# Patient Record
Sex: MALE | Race: WHITE | Employment: FULL TIME | URBAN - METROPOLITAN AREA
[De-identification: names, ages, dates, MRNs, and addresses within clinical notes are randomized per-mention and may not be internally consistent; named-entity substitution may affect disease eponyms.]

---

## 2022-10-19 ENCOUNTER — ANESTHESIA (OUTPATIENT)
Dept: SURGERY | Age: 62
End: 2022-10-19
Payer: MEDICAID

## 2022-10-19 ENCOUNTER — ANESTHESIA EVENT (OUTPATIENT)
Dept: SURGERY | Age: 62
End: 2022-10-19
Payer: MEDICAID

## 2022-10-19 ENCOUNTER — APPOINTMENT (OUTPATIENT)
Dept: CT IMAGING | Age: 62
DRG: 328 | End: 2022-10-19
Attending: EMERGENCY MEDICINE
Payer: COMMERCIAL

## 2022-10-19 ENCOUNTER — HOSPITAL ENCOUNTER (INPATIENT)
Age: 62
LOS: 7 days | Discharge: HOME HEALTH | DRG: 328 | End: 2022-10-26
Attending: EMERGENCY MEDICINE | Admitting: SURGERY
Payer: COMMERCIAL

## 2022-10-19 ENCOUNTER — APPOINTMENT (OUTPATIENT)
Dept: CT IMAGING | Age: 62
DRG: 328 | End: 2022-10-19
Attending: STUDENT IN AN ORGANIZED HEALTH CARE EDUCATION/TRAINING PROGRAM
Payer: COMMERCIAL

## 2022-10-19 DIAGNOSIS — R10.84 ABDOMINAL PAIN, GENERALIZED: ICD-10-CM

## 2022-10-19 DIAGNOSIS — K27.5 PERFORATED ULCER (HCC): ICD-10-CM

## 2022-10-19 DIAGNOSIS — K63.1 SMALL BOWEL PERFORATION (HCC): Primary | ICD-10-CM

## 2022-10-19 LAB
ALBUMIN SERPL-MCNC: 3.4 G/DL (ref 3.5–5)
ALBUMIN/GLOB SERPL: 0.9 {RATIO} (ref 1.1–2.2)
ALP SERPL-CCNC: 59 U/L (ref 45–117)
ALT SERPL-CCNC: 19 U/L (ref 12–78)
ANION GAP SERPL CALC-SCNC: 7 MMOL/L (ref 5–15)
AST SERPL-CCNC: 16 U/L (ref 15–37)
BASOPHILS # BLD: 0 K/UL (ref 0–0.1)
BASOPHILS NFR BLD: 0 % (ref 0–1)
BILIRUB SERPL-MCNC: 0.3 MG/DL (ref 0.2–1)
BUN SERPL-MCNC: 20 MG/DL (ref 6–20)
BUN/CREAT SERPL: 14 (ref 12–20)
CALCIUM SERPL-MCNC: 9.2 MG/DL (ref 8.5–10.1)
CHLORIDE SERPL-SCNC: 109 MMOL/L (ref 97–108)
CO2 SERPL-SCNC: 24 MMOL/L (ref 21–32)
COMMENT, HOLDF: NORMAL
CREAT SERPL-MCNC: 1.38 MG/DL (ref 0.7–1.3)
DIFFERENTIAL METHOD BLD: ABNORMAL
EOSINOPHIL # BLD: 0 K/UL (ref 0–0.4)
EOSINOPHIL NFR BLD: 0 % (ref 0–7)
ERYTHROCYTE [DISTWIDTH] IN BLOOD BY AUTOMATED COUNT: 13.4 % (ref 11.5–14.5)
GLOBULIN SER CALC-MCNC: 3.9 G/DL (ref 2–4)
GLUCOSE SERPL-MCNC: 178 MG/DL (ref 65–100)
HCT VFR BLD AUTO: 38.3 % (ref 36.6–50.3)
HGB BLD-MCNC: 12.4 G/DL (ref 12.1–17)
IMM GRANULOCYTES # BLD AUTO: 0.2 K/UL (ref 0–0.04)
IMM GRANULOCYTES NFR BLD AUTO: 1 % (ref 0–0.5)
LIPASE SERPL-CCNC: 623 U/L (ref 73–393)
LYMPHOCYTES # BLD: 0.5 K/UL (ref 0.8–3.5)
LYMPHOCYTES NFR BLD: 3 % (ref 12–49)
MCH RBC QN AUTO: 28 PG (ref 26–34)
MCHC RBC AUTO-ENTMCNC: 32.4 G/DL (ref 30–36.5)
MCV RBC AUTO: 86.5 FL (ref 80–99)
MONOCYTES # BLD: 0.7 K/UL (ref 0–1)
MONOCYTES NFR BLD: 4 % (ref 5–13)
NEUTS SEG # BLD: 16.8 K/UL (ref 1.8–8)
NEUTS SEG NFR BLD: 92 % (ref 32–75)
NRBC # BLD: 0 K/UL (ref 0–0.01)
NRBC BLD-RTO: 0 PER 100 WBC
PLATELET # BLD AUTO: 394 K/UL (ref 150–400)
PMV BLD AUTO: 11.4 FL (ref 8.9–12.9)
POTASSIUM SERPL-SCNC: 3.2 MMOL/L (ref 3.5–5.1)
PROT SERPL-MCNC: 7.3 G/DL (ref 6.4–8.2)
RBC # BLD AUTO: 4.43 M/UL (ref 4.1–5.7)
RBC MORPH BLD: ABNORMAL
SAMPLES BEING HELD,HOLD: NORMAL
SODIUM SERPL-SCNC: 140 MMOL/L (ref 136–145)
WBC # BLD AUTO: 18.2 K/UL (ref 4.1–11.1)

## 2022-10-19 PROCEDURE — 74011000250 HC RX REV CODE- 250: Performed by: SURGERY

## 2022-10-19 PROCEDURE — 74177 CT ABD & PELVIS W/CONTRAST: CPT

## 2022-10-19 PROCEDURE — 77030002996 HC SUT SLK J&J -A: Performed by: SURGERY

## 2022-10-19 PROCEDURE — 74011000636 HC RX REV CODE- 636: Performed by: EMERGENCY MEDICINE

## 2022-10-19 PROCEDURE — 99285 EMERGENCY DEPT VISIT HI MDM: CPT

## 2022-10-19 PROCEDURE — 77030008684 HC TU ET CUF COVD -B: Performed by: ANESTHESIOLOGY

## 2022-10-19 PROCEDURE — 74011250636 HC RX REV CODE- 250/636: Performed by: EMERGENCY MEDICINE

## 2022-10-19 PROCEDURE — 76010000149 HC OR TIME 1 TO 1.5 HR: Performed by: SURGERY

## 2022-10-19 PROCEDURE — 85025 COMPLETE CBC W/AUTO DIFF WBC: CPT

## 2022-10-19 PROCEDURE — 74011250636 HC RX REV CODE- 250/636: Performed by: ANESTHESIOLOGY

## 2022-10-19 PROCEDURE — 77030031139 HC SUT VCRL2 J&J -A: Performed by: SURGERY

## 2022-10-19 PROCEDURE — 71275 CT ANGIOGRAPHY CHEST: CPT

## 2022-10-19 PROCEDURE — 83690 ASSAY OF LIPASE: CPT

## 2022-10-19 PROCEDURE — 74011000250 HC RX REV CODE- 250: Performed by: NURSE ANESTHETIST, CERTIFIED REGISTERED

## 2022-10-19 PROCEDURE — 77030020061 HC IV BLD WRMR ADMIN SET 3M -B: Performed by: ANESTHESIOLOGY

## 2022-10-19 PROCEDURE — 76060000033 HC ANESTHESIA 1 TO 1.5 HR: Performed by: SURGERY

## 2022-10-19 PROCEDURE — 74011250636 HC RX REV CODE- 250/636: Performed by: STUDENT IN AN ORGANIZED HEALTH CARE EDUCATION/TRAINING PROGRAM

## 2022-10-19 PROCEDURE — 77030019908 HC STETH ESOPH SIMS -A: Performed by: ANESTHESIOLOGY

## 2022-10-19 PROCEDURE — 0DU707Z SUPPLEMENT STOMACH, PYLORUS WITH AUTOLOGOUS TISSUE SUBSTITUTE, OPEN APPROACH: ICD-10-PCS | Performed by: SURGERY

## 2022-10-19 PROCEDURE — 77030008771 HC TU NG SALEM SUMP -A: Performed by: ANESTHESIOLOGY

## 2022-10-19 PROCEDURE — 77030005513 HC CATH URETH FOL11 MDII -B: Performed by: SURGERY

## 2022-10-19 PROCEDURE — 77030002916 HC SUT ETHLN J&J -A: Performed by: SURGERY

## 2022-10-19 PROCEDURE — G0378 HOSPITAL OBSERVATION PER HR: HCPCS

## 2022-10-19 PROCEDURE — 77030026438 HC STYL ET INTUB CARD -A: Performed by: ANESTHESIOLOGY

## 2022-10-19 PROCEDURE — 74011250636 HC RX REV CODE- 250/636: Performed by: NURSE ANESTHETIST, CERTIFIED REGISTERED

## 2022-10-19 PROCEDURE — 77030010507 HC ADH SKN DERMBND J&J -B: Performed by: SURGERY

## 2022-10-19 PROCEDURE — 96365 THER/PROPH/DIAG IV INF INIT: CPT

## 2022-10-19 PROCEDURE — 74011250636 HC RX REV CODE- 250/636: Performed by: SURGERY

## 2022-10-19 PROCEDURE — 36415 COLL VENOUS BLD VENIPUNCTURE: CPT

## 2022-10-19 PROCEDURE — 74011000258 HC RX REV CODE- 258: Performed by: NURSE ANESTHETIST, CERTIFIED REGISTERED

## 2022-10-19 PROCEDURE — 74011000258 HC RX REV CODE- 258: Performed by: EMERGENCY MEDICINE

## 2022-10-19 PROCEDURE — 80053 COMPREHEN METABOLIC PANEL: CPT

## 2022-10-19 PROCEDURE — 65270000029 HC RM PRIVATE

## 2022-10-19 PROCEDURE — C9113 INJ PANTOPRAZOLE SODIUM, VIA: HCPCS | Performed by: SURGERY

## 2022-10-19 PROCEDURE — 77030002933 HC SUT MCRYL J&J -A: Performed by: SURGERY

## 2022-10-19 PROCEDURE — 77030002966 HC SUT PDS J&J -A: Performed by: SURGERY

## 2022-10-19 PROCEDURE — 77030013079 HC BLNKT BAIR HGGR 3M -A: Performed by: ANESTHESIOLOGY

## 2022-10-19 PROCEDURE — 96375 TX/PRO/DX INJ NEW DRUG ADDON: CPT

## 2022-10-19 PROCEDURE — 76210000000 HC OR PH I REC 2 TO 2.5 HR: Performed by: SURGERY

## 2022-10-19 PROCEDURE — 2709999900 HC NON-CHARGEABLE SUPPLY: Performed by: SURGERY

## 2022-10-19 RX ORDER — SODIUM CHLORIDE, SODIUM LACTATE, POTASSIUM CHLORIDE, CALCIUM CHLORIDE 600; 310; 30; 20 MG/100ML; MG/100ML; MG/100ML; MG/100ML
150 INJECTION, SOLUTION INTRAVENOUS CONTINUOUS
Status: DISCONTINUED | OUTPATIENT
Start: 2022-10-19 | End: 2022-10-19 | Stop reason: HOSPADM

## 2022-10-19 RX ORDER — ALBUTEROL SULFATE 0.83 MG/ML
2.5 SOLUTION RESPIRATORY (INHALATION) AS NEEDED
Status: DISCONTINUED | OUTPATIENT
Start: 2022-10-19 | End: 2022-10-20 | Stop reason: HOSPADM

## 2022-10-19 RX ORDER — HYDROMORPHONE HYDROCHLORIDE 1 MG/ML
0.5 INJECTION, SOLUTION INTRAMUSCULAR; INTRAVENOUS; SUBCUTANEOUS
Status: DISCONTINUED | OUTPATIENT
Start: 2022-10-19 | End: 2022-10-20 | Stop reason: HOSPADM

## 2022-10-19 RX ORDER — MORPHINE SULFATE 4 MG/ML
4 INJECTION INTRAVENOUS ONCE
Status: COMPLETED | OUTPATIENT
Start: 2022-10-19 | End: 2022-10-19

## 2022-10-19 RX ORDER — DIPHENHYDRAMINE HYDROCHLORIDE 50 MG/ML
12.5 INJECTION, SOLUTION INTRAMUSCULAR; INTRAVENOUS AS NEEDED
Status: ACTIVE | OUTPATIENT
Start: 2022-10-19 | End: 2022-10-19

## 2022-10-19 RX ORDER — HYDROMORPHONE HYDROCHLORIDE 1 MG/ML
1 INJECTION, SOLUTION INTRAMUSCULAR; INTRAVENOUS; SUBCUTANEOUS
Status: DISCONTINUED | OUTPATIENT
Start: 2022-10-19 | End: 2022-10-23

## 2022-10-19 RX ORDER — FLUCONAZOLE 2 MG/ML
400 INJECTION, SOLUTION INTRAVENOUS EVERY 24 HOURS
Status: DISCONTINUED | OUTPATIENT
Start: 2022-10-19 | End: 2022-10-24

## 2022-10-19 RX ORDER — SODIUM CHLORIDE, SODIUM LACTATE, POTASSIUM CHLORIDE, CALCIUM CHLORIDE 600; 310; 30; 20 MG/100ML; MG/100ML; MG/100ML; MG/100ML
75 INJECTION, SOLUTION INTRAVENOUS CONTINUOUS
Status: DISCONTINUED | OUTPATIENT
Start: 2022-10-19 | End: 2022-10-23

## 2022-10-19 RX ORDER — ONDANSETRON 2 MG/ML
4 INJECTION INTRAMUSCULAR; INTRAVENOUS AS NEEDED
Status: DISCONTINUED | OUTPATIENT
Start: 2022-10-19 | End: 2022-10-20 | Stop reason: HOSPADM

## 2022-10-19 RX ORDER — ROCURONIUM BROMIDE 10 MG/ML
INJECTION, SOLUTION INTRAVENOUS AS NEEDED
Status: DISCONTINUED | OUTPATIENT
Start: 2022-10-19 | End: 2022-10-19 | Stop reason: HOSPADM

## 2022-10-19 RX ORDER — DEXAMETHASONE SODIUM PHOSPHATE 4 MG/ML
INJECTION, SOLUTION INTRA-ARTICULAR; INTRALESIONAL; INTRAMUSCULAR; INTRAVENOUS; SOFT TISSUE AS NEEDED
Status: DISCONTINUED | OUTPATIENT
Start: 2022-10-19 | End: 2022-10-19 | Stop reason: HOSPADM

## 2022-10-19 RX ORDER — PROPOFOL 10 MG/ML
INJECTION, EMULSION INTRAVENOUS AS NEEDED
Status: DISCONTINUED | OUTPATIENT
Start: 2022-10-19 | End: 2022-10-19 | Stop reason: HOSPADM

## 2022-10-19 RX ORDER — DIPHENHYDRAMINE HCL 25 MG
25 CAPSULE ORAL
COMMUNITY

## 2022-10-19 RX ORDER — GABAPENTIN 300 MG/1
300 CAPSULE ORAL 3 TIMES DAILY
COMMUNITY

## 2022-10-19 RX ORDER — AMLODIPINE BESYLATE 10 MG/1
10 TABLET ORAL DAILY
COMMUNITY
Start: 2022-07-18

## 2022-10-19 RX ORDER — KETOROLAC TROMETHAMINE 30 MG/ML
30 INJECTION, SOLUTION INTRAMUSCULAR; INTRAVENOUS ONCE
Status: COMPLETED | OUTPATIENT
Start: 2022-10-19 | End: 2022-10-19

## 2022-10-19 RX ORDER — SODIUM CHLORIDE, SODIUM LACTATE, POTASSIUM CHLORIDE, CALCIUM CHLORIDE 600; 310; 30; 20 MG/100ML; MG/100ML; MG/100ML; MG/100ML
INJECTION, SOLUTION INTRAVENOUS
Status: DISCONTINUED | OUTPATIENT
Start: 2022-10-19 | End: 2022-10-19 | Stop reason: HOSPADM

## 2022-10-19 RX ORDER — SUCCINYLCHOLINE CHLORIDE 20 MG/ML
INJECTION INTRAMUSCULAR; INTRAVENOUS AS NEEDED
Status: DISCONTINUED | OUTPATIENT
Start: 2022-10-19 | End: 2022-10-19 | Stop reason: HOSPADM

## 2022-10-19 RX ORDER — MIDAZOLAM HYDROCHLORIDE 1 MG/ML
INJECTION, SOLUTION INTRAMUSCULAR; INTRAVENOUS AS NEEDED
Status: DISCONTINUED | OUTPATIENT
Start: 2022-10-19 | End: 2022-10-19 | Stop reason: HOSPADM

## 2022-10-19 RX ORDER — ONDANSETRON 2 MG/ML
INJECTION INTRAMUSCULAR; INTRAVENOUS AS NEEDED
Status: DISCONTINUED | OUTPATIENT
Start: 2022-10-19 | End: 2022-10-19 | Stop reason: HOSPADM

## 2022-10-19 RX ORDER — ASPIRIN 325 MG
325 TABLET, DELAYED RELEASE (ENTERIC COATED) ORAL
COMMUNITY

## 2022-10-19 RX ORDER — IBUPROFEN 200 MG
200 TABLET ORAL
COMMUNITY

## 2022-10-19 RX ORDER — ONDANSETRON 2 MG/ML
4 INJECTION INTRAMUSCULAR; INTRAVENOUS
Status: DISCONTINUED | OUTPATIENT
Start: 2022-10-19 | End: 2022-10-26 | Stop reason: HOSPADM

## 2022-10-19 RX ORDER — SODIUM CHLORIDE, SODIUM LACTATE, POTASSIUM CHLORIDE, CALCIUM CHLORIDE 600; 310; 30; 20 MG/100ML; MG/100ML; MG/100ML; MG/100ML
125 INJECTION, SOLUTION INTRAVENOUS CONTINUOUS
Status: DISCONTINUED | OUTPATIENT
Start: 2022-10-19 | End: 2022-10-20 | Stop reason: HOSPADM

## 2022-10-19 RX ORDER — FENTANYL CITRATE 50 UG/ML
INJECTION, SOLUTION INTRAMUSCULAR; INTRAVENOUS AS NEEDED
Status: DISCONTINUED | OUTPATIENT
Start: 2022-10-19 | End: 2022-10-19 | Stop reason: HOSPADM

## 2022-10-19 RX ORDER — LIDOCAINE HYDROCHLORIDE 20 MG/ML
INJECTION, SOLUTION EPIDURAL; INFILTRATION; INTRACAUDAL; PERINEURAL AS NEEDED
Status: DISCONTINUED | OUTPATIENT
Start: 2022-10-19 | End: 2022-10-19 | Stop reason: HOSPADM

## 2022-10-19 RX ADMIN — SODIUM CHLORIDE 40 MG: 9 INJECTION INTRAMUSCULAR; INTRAVENOUS; SUBCUTANEOUS at 23:40

## 2022-10-19 RX ADMIN — LIDOCAINE HYDROCHLORIDE 40 MG: 20 INJECTION, SOLUTION EPIDURAL; INFILTRATION; INTRACAUDAL; PERINEURAL at 21:41

## 2022-10-19 RX ADMIN — PROPOFOL 120 MG: 10 INJECTION, EMULSION INTRAVENOUS at 21:42

## 2022-10-19 RX ADMIN — FLUCONAZOLE 400 MG: 2 INJECTION, SOLUTION INTRAVENOUS at 23:44

## 2022-10-19 RX ADMIN — PHENYLEPHRINE HYDROCHLORIDE 80 MCG: 10 INJECTION INTRAVENOUS at 22:06

## 2022-10-19 RX ADMIN — FENTANYL CITRATE 50 MCG: 50 INJECTION, SOLUTION INTRAMUSCULAR; INTRAVENOUS at 21:37

## 2022-10-19 RX ADMIN — SODIUM CHLORIDE, POTASSIUM CHLORIDE, SODIUM LACTATE AND CALCIUM CHLORIDE 125 ML/HR: 600; 310; 30; 20 INJECTION, SOLUTION INTRAVENOUS at 23:43

## 2022-10-19 RX ADMIN — DEXAMETHASONE SODIUM PHOSPHATE 4 MG: 4 INJECTION, SOLUTION INTRAMUSCULAR; INTRAVENOUS at 22:06

## 2022-10-19 RX ADMIN — ONDANSETRON HYDROCHLORIDE 4 MG: 2 SOLUTION INTRAMUSCULAR; INTRAVENOUS at 22:07

## 2022-10-19 RX ADMIN — MIDAZOLAM HYDROCHLORIDE 3 MG: 1 INJECTION, SOLUTION INTRAMUSCULAR; INTRAVENOUS at 21:37

## 2022-10-19 RX ADMIN — MORPHINE SULFATE 4 MG: 4 INJECTION INTRAVENOUS at 19:53

## 2022-10-19 RX ADMIN — ROCURONIUM BROMIDE 10 MG: 10 INJECTION INTRAVENOUS at 21:58

## 2022-10-19 RX ADMIN — FENTANYL CITRATE 50 MCG: 50 INJECTION, SOLUTION INTRAMUSCULAR; INTRAVENOUS at 21:40

## 2022-10-19 RX ADMIN — SODIUM CHLORIDE, POTASSIUM CHLORIDE, SODIUM LACTATE AND CALCIUM CHLORIDE: 600; 310; 30; 20 INJECTION, SOLUTION INTRAVENOUS at 21:50

## 2022-10-19 RX ADMIN — SUCCINYLCHOLINE CHLORIDE 100 MG: 20 INJECTION, SOLUTION INTRAMUSCULAR; INTRAVENOUS at 21:42

## 2022-10-19 RX ADMIN — PHENYLEPHRINE HYDROCHLORIDE 40 MCG: 10 INJECTION INTRAVENOUS at 21:52

## 2022-10-19 RX ADMIN — FENTANYL CITRATE 50 MCG: 50 INJECTION, SOLUTION INTRAMUSCULAR; INTRAVENOUS at 22:43

## 2022-10-19 RX ADMIN — SODIUM CHLORIDE 1000 ML: 9 INJECTION, SOLUTION INTRAVENOUS at 16:38

## 2022-10-19 RX ADMIN — IOPAMIDOL 100 ML: 755 INJECTION, SOLUTION INTRAVENOUS at 17:07

## 2022-10-19 RX ADMIN — IOPAMIDOL 100 ML: 755 INJECTION, SOLUTION INTRAVENOUS at 19:41

## 2022-10-19 RX ADMIN — PROPOFOL 30 MG: 10 INJECTION, EMULSION INTRAVENOUS at 21:55

## 2022-10-19 RX ADMIN — KETOROLAC TROMETHAMINE 30 MG: 30 INJECTION, SOLUTION INTRAMUSCULAR at 16:38

## 2022-10-19 RX ADMIN — SODIUM CHLORIDE, POTASSIUM CHLORIDE, SODIUM LACTATE AND CALCIUM CHLORIDE: 600; 310; 30; 20 INJECTION, SOLUTION INTRAVENOUS at 21:30

## 2022-10-19 RX ADMIN — ROCURONIUM BROMIDE 25 MG: 10 INJECTION INTRAVENOUS at 21:54

## 2022-10-19 RX ADMIN — ROCURONIUM BROMIDE 5 MG: 10 INJECTION INTRAVENOUS at 21:41

## 2022-10-19 RX ADMIN — SUGAMMADEX 130 MG: 100 INJECTION, SOLUTION INTRAVENOUS at 22:34

## 2022-10-19 RX ADMIN — PIPERACILLIN AND TAZOBACTAM 4.5 G: 4; .5 INJECTION, POWDER, LYOPHILIZED, FOR SOLUTION INTRAVENOUS at 19:51

## 2022-10-19 NOTE — ED TRIAGE NOTES
Patient with right upper and lower quadrant pain since 0830 this morning.  Denies any CP, SON or N/V/D    Takes 20-30 ibuprofen daily for spinal stenosis       100mcg fentanyl given by EMA prior to arrival

## 2022-10-19 NOTE — ED PROVIDER NOTES
28-year-old male with no significant past medical history presents to ED with 1 day of right-sided abdominal pain. Patient reports that around 830 this morning he had a sudden onset of sharp, stabbing right-sided abdominal pain and his right upper and right lower quadrants. He reports that he was at work at the time and the pain has persisted since then. He denies any associated nausea, vomiting, diarrhea, chest pain, shortness of breath, dysuria, urinary frequency, fevers or chills. No history of similar symptoms. No history of abdominal surgeries. He reports he last ate this morning when he ate a \"sizzlie\" from [facility]. The history is provided by the patient. Abdominal Pain   Pertinent negatives include no fever, no nausea, no vomiting, no dysuria, no headaches, no myalgias and no chest pain. No past medical history on file. No past surgical history on file. No family history on file. Social History     Socioeconomic History    Marital status: SINGLE     Spouse name: Not on file    Number of children: Not on file    Years of education: Not on file    Highest education level: Not on file   Occupational History    Not on file   Tobacco Use    Smoking status: Not on file    Smokeless tobacco: Not on file   Substance and Sexual Activity    Alcohol use: Not on file    Drug use: Not on file    Sexual activity: Not on file   Other Topics Concern    Not on file   Social History Narrative    Not on file     Social Determinants of Health     Financial Resource Strain: Not on file   Food Insecurity: Not on file   Transportation Needs: Not on file   Physical Activity: Not on file   Stress: Not on file   Social Connections: Not on file   Intimate Partner Violence: Not on file   Housing Stability: Not on file         ALLERGIES: Patient has no known allergies. Review of Systems   Constitutional:  Negative for fever. HENT:  Negative for congestion and sinus pain. Respiratory:  Negative for cough. Cardiovascular:  Negative for chest pain. Gastrointestinal:  Positive for abdominal pain. Negative for nausea and vomiting. Genitourinary:  Negative for dysuria. Musculoskeletal:  Negative for myalgias. Neurological:  Negative for headaches. Hematological:  Negative for adenopathy. All other systems reviewed and are negative. Vitals:    10/19/22 1510   BP: (!) 148/88   Pulse: 88   Resp: 18   Temp: 97.9 °F (36.6 °C)   SpO2: 95%   Weight: 65.8 kg (145 lb)   Height: 6' (1.829 m)            Physical Exam  Vitals and nursing note reviewed. Constitutional:       Appearance: Normal appearance. Eyes:      Extraocular Movements: Extraocular movements intact. Pupils: Pupils are equal, round, and reactive to light. Cardiovascular:      Rate and Rhythm: Normal rate and regular rhythm. Heart sounds: Normal heart sounds. Pulmonary:      Effort: Pulmonary effort is normal.      Breath sounds: Normal breath sounds. Abdominal:      Palpations: Abdomen is soft. Tenderness: There is abdominal tenderness in the right upper quadrant and right lower quadrant. There is guarding. There is no rebound. Lymphadenopathy:      Cervical: No cervical adenopathy. Skin:     General: Skin is warm and dry. Neurological:      General: No focal deficit present. Mental Status: He is alert and oriented to person, place, and time. Psychiatric:         Mood and Affect: Mood normal.         Behavior: Behavior normal.        MDM  Number of Diagnoses or Management Options  Abdominal pain, generalized  Small bowel perforation ([address]  Diagnosis management comments: 30-year-old male with no significant past medical history presents to ED with 1 day of right-sided abdominal pain. Physical exam notable for abdominal tenderness to right upper and right lower quadrant with guarding but no rebound. Labs notable for elevated white blood cell count 18.2 and elevated lipase at 623.   CT of abdomen pelvis showed circumferential gastric antral and proximal duodenal with ascites and pneumoperitoneum consistent with small bowel perforation. Also with some concern for likely pulmonary embolism lungs but CTA showed no pulmonary emboli. Mucous plugging and bronchi extend right lower lobe consistent with aspiration/bronchitis. Consulted with general surgery who recommended patient be transferred to the OR. Started IV fluids, Zosyn, Toradol and morphine while in ED. Patient will be admitted per surgery.        Amount and/or Complexity of Data Reviewed  Clinical lab tests: reviewed  Tests in the radiology section of CPT®: reviewed      ED Course as of 10/19/22 8182   Wed Oct 19, 2022   1933 Talked to Dr. Magnolia Roberts who will post patient for OR and admit him [AH]      ED Course User Index  [AH] TEODORA Manning       Procedures

## 2022-10-19 NOTE — ED NOTES
Pt instructed that we need urine specimen. Pt states that he is unable to void at this time. States he will notify staff when he has to void and is able to provide sample.

## 2022-10-20 LAB
ANION GAP SERPL CALC-SCNC: 10 MMOL/L (ref 5–15)
APPEARANCE UR: CLEAR
BACTERIA URNS QL MICRO: NEGATIVE /HPF
BASOPHILS # BLD: 0 K/UL (ref 0–0.1)
BASOPHILS NFR BLD: 0 % (ref 0–1)
BILIRUB UR QL: NEGATIVE
BUN SERPL-MCNC: 18 MG/DL (ref 6–20)
BUN/CREAT SERPL: 15 (ref 12–20)
CALCIUM SERPL-MCNC: 8.3 MG/DL (ref 8.5–10.1)
CHLORIDE SERPL-SCNC: 113 MMOL/L (ref 97–108)
CO2 SERPL-SCNC: 23 MMOL/L (ref 21–32)
COLOR UR: ABNORMAL
CREAT SERPL-MCNC: 1.21 MG/DL (ref 0.7–1.3)
DIFFERENTIAL METHOD BLD: ABNORMAL
EOSINOPHIL # BLD: 0 K/UL (ref 0–0.4)
EOSINOPHIL NFR BLD: 0 % (ref 0–7)
EPITH CASTS URNS QL MICRO: ABNORMAL /LPF
ERYTHROCYTE [DISTWIDTH] IN BLOOD BY AUTOMATED COUNT: 13.5 % (ref 11.5–14.5)
GLUCOSE SERPL-MCNC: 186 MG/DL (ref 65–100)
GLUCOSE UR STRIP.AUTO-MCNC: NEGATIVE MG/DL
HCT VFR BLD AUTO: 35.1 % (ref 36.6–50.3)
HGB BLD-MCNC: 11.4 G/DL (ref 12.1–17)
HGB UR QL STRIP: NEGATIVE
HYALINE CASTS URNS QL MICRO: ABNORMAL /LPF (ref 0–2)
IMM GRANULOCYTES # BLD AUTO: 0 K/UL
IMM GRANULOCYTES NFR BLD AUTO: 0 %
KETONES UR QL STRIP.AUTO: NEGATIVE MG/DL
LEUKOCYTE ESTERASE UR QL STRIP.AUTO: NEGATIVE
LYMPHOCYTES # BLD: 0.7 K/UL (ref 0.8–3.5)
LYMPHOCYTES NFR BLD: 4 % (ref 12–49)
MCH RBC QN AUTO: 27.9 PG (ref 26–34)
MCHC RBC AUTO-ENTMCNC: 32.5 G/DL (ref 30–36.5)
MCV RBC AUTO: 85.8 FL (ref 80–99)
METAMYELOCYTES NFR BLD MANUAL: 1 %
MONOCYTES # BLD: 0.2 K/UL (ref 0–1)
MONOCYTES NFR BLD: 1 % (ref 5–13)
NEUTS BAND NFR BLD MANUAL: 11 % (ref 0–6)
NEUTS SEG # BLD: 16.8 K/UL (ref 1.8–8)
NEUTS SEG NFR BLD: 83 % (ref 32–75)
NITRITE UR QL STRIP.AUTO: NEGATIVE
NRBC # BLD: 0 K/UL (ref 0–0.01)
NRBC BLD-RTO: 0 PER 100 WBC
PH UR STRIP: 6 [PH] (ref 5–8)
PLATELET # BLD AUTO: 308 K/UL (ref 150–400)
PMV BLD AUTO: 12.1 FL (ref 8.9–12.9)
POTASSIUM SERPL-SCNC: 3 MMOL/L (ref 3.5–5.1)
PROT UR STRIP-MCNC: 30 MG/DL
RBC # BLD AUTO: 4.09 M/UL (ref 4.1–5.7)
RBC #/AREA URNS HPF: ABNORMAL /HPF (ref 0–5)
RBC MORPH BLD: ABNORMAL
SODIUM SERPL-SCNC: 146 MMOL/L (ref 136–145)
SP GR UR REFRACTOMETRY: 1.01 (ref 1–1.03)
UA: UC IF INDICATED,UAUC: ABNORMAL
UROBILINOGEN UR QL STRIP.AUTO: 1 EU/DL (ref 0.2–1)
WBC # BLD AUTO: 17.9 K/UL (ref 4.1–11.1)
WBC URNS QL MICRO: ABNORMAL /HPF (ref 0–4)

## 2022-10-20 PROCEDURE — C9113 INJ PANTOPRAZOLE SODIUM, VIA: HCPCS | Performed by: SURGERY

## 2022-10-20 PROCEDURE — 74011000250 HC RX REV CODE- 250: Performed by: SURGERY

## 2022-10-20 PROCEDURE — 80048 BASIC METABOLIC PNL TOTAL CA: CPT

## 2022-10-20 PROCEDURE — 74011250636 HC RX REV CODE- 250/636: Performed by: ANESTHESIOLOGY

## 2022-10-20 PROCEDURE — 2709999900 HC NON-CHARGEABLE SUPPLY

## 2022-10-20 PROCEDURE — 81001 URINALYSIS AUTO W/SCOPE: CPT

## 2022-10-20 PROCEDURE — 85025 COMPLETE CBC W/AUTO DIFF WBC: CPT

## 2022-10-20 PROCEDURE — 36415 COLL VENOUS BLD VENIPUNCTURE: CPT

## 2022-10-20 PROCEDURE — 65270000029 HC RM PRIVATE

## 2022-10-20 PROCEDURE — 74011250636 HC RX REV CODE- 250/636: Performed by: SURGERY

## 2022-10-20 PROCEDURE — 74011250636 HC RX REV CODE- 250/636: Performed by: NURSE PRACTITIONER

## 2022-10-20 PROCEDURE — 74011000258 HC RX REV CODE- 258: Performed by: SURGERY

## 2022-10-20 PROCEDURE — 94761 N-INVAS EAR/PLS OXIMETRY MLT: CPT

## 2022-10-20 RX ORDER — GUAIFENESIN 100 MG/5ML
81 LIQUID (ML) ORAL DAILY
COMMUNITY

## 2022-10-20 RX ORDER — ENOXAPARIN SODIUM 100 MG/ML
40 INJECTION SUBCUTANEOUS EVERY 24 HOURS
Status: DISCONTINUED | OUTPATIENT
Start: 2022-10-20 | End: 2022-10-26 | Stop reason: HOSPADM

## 2022-10-20 RX ORDER — POTASSIUM CHLORIDE 7.45 MG/ML
10 INJECTION INTRAVENOUS
Status: COMPLETED | OUTPATIENT
Start: 2022-10-20 | End: 2022-10-21

## 2022-10-20 RX ORDER — HYDRALAZINE HYDROCHLORIDE 20 MG/ML
20 INJECTION INTRAMUSCULAR; INTRAVENOUS
Status: DISCONTINUED | OUTPATIENT
Start: 2022-10-20 | End: 2022-10-26 | Stop reason: HOSPADM

## 2022-10-20 RX ADMIN — SODIUM CHLORIDE 40 MG: 9 INJECTION INTRAMUSCULAR; INTRAVENOUS; SUBCUTANEOUS at 20:10

## 2022-10-20 RX ADMIN — HYDROMORPHONE HYDROCHLORIDE 0.5 MG: 1 INJECTION, SOLUTION INTRAMUSCULAR; INTRAVENOUS; SUBCUTANEOUS at 00:32

## 2022-10-20 RX ADMIN — FLUCONAZOLE 400 MG: 2 INJECTION, SOLUTION INTRAVENOUS at 20:11

## 2022-10-20 RX ADMIN — HYDROMORPHONE HYDROCHLORIDE 1 MG: 1 INJECTION, SOLUTION INTRAMUSCULAR; INTRAVENOUS; SUBCUTANEOUS at 13:56

## 2022-10-20 RX ADMIN — HYDROMORPHONE HYDROCHLORIDE 1 MG: 1 INJECTION, SOLUTION INTRAMUSCULAR; INTRAVENOUS; SUBCUTANEOUS at 20:10

## 2022-10-20 RX ADMIN — ENOXAPARIN SODIUM 40 MG: 100 INJECTION SUBCUTANEOUS at 09:31

## 2022-10-20 RX ADMIN — PIPERACILLIN AND TAZOBACTAM 3.38 G: 3; .375 INJECTION, POWDER, LYOPHILIZED, FOR SOLUTION INTRAVENOUS at 20:11

## 2022-10-20 RX ADMIN — PIPERACILLIN AND TAZOBACTAM 3.38 G: 3; .375 INJECTION, POWDER, LYOPHILIZED, FOR SOLUTION INTRAVENOUS at 12:42

## 2022-10-20 RX ADMIN — PIPERACILLIN AND TAZOBACTAM 3.38 G: 3; .375 INJECTION, POWDER, LYOPHILIZED, FOR SOLUTION INTRAVENOUS at 04:21

## 2022-10-20 RX ADMIN — POTASSIUM CHLORIDE 10 MEQ: 7.46 INJECTION, SOLUTION INTRAVENOUS at 12:38

## 2022-10-20 RX ADMIN — SODIUM CHLORIDE, POTASSIUM CHLORIDE, SODIUM LACTATE AND CALCIUM CHLORIDE 125 ML/HR: 600; 310; 30; 20 INJECTION, SOLUTION INTRAVENOUS at 20:12

## 2022-10-20 RX ADMIN — SODIUM CHLORIDE 40 MG: 9 INJECTION INTRAMUSCULAR; INTRAVENOUS; SUBCUTANEOUS at 09:30

## 2022-10-20 RX ADMIN — POTASSIUM CHLORIDE 10 MEQ: 7.46 INJECTION, SOLUTION INTRAVENOUS at 09:31

## 2022-10-20 RX ADMIN — POTASSIUM CHLORIDE 10 MEQ: 7.46 INJECTION, SOLUTION INTRAVENOUS at 11:32

## 2022-10-20 RX ADMIN — POTASSIUM CHLORIDE 10 MEQ: 7.46 INJECTION, SOLUTION INTRAVENOUS at 13:47

## 2022-10-20 NOTE — H&P
Surgery History and Physical    Subjective:      Su Lucio is a 63 y.o. male with no significant past medical history presents to ED with 1 day of right-sided abdominal pain. Patient reports that around 830 this morning he had a sudden onset of sharp, stabbing right-sided abdominal pain and his right upper and right lower quadrants. He reports that he was at work at the time and the pain has persisted since then. He denies any associated nausea, vomiting, diarrhea, chest pain, shortness of breath, dysuria, urinary frequency, fevers or chills. No history of similar symptoms. No history of abdominal surgeries. He takes a significant amount of ibuprofen (20-30 tablets) every day. In the ER his pain has continued and he had a ct showing some free air and free fluid. Patient Active Problem List    Diagnosis Date Noted    Perforated bowel ([facility] [address] 10/19/2022     No past medical history on file. PMH: spinal stenosis and chronic back pain  PSH: spinal surgery  No past surgical history on file. Social History     Tobacco Use    Smoking status: Not on file    Smokeless tobacco: Not on file   Substance Use Topics    Alcohol use: Not on file      No family history on file. Prior to Admission medications    Not on File   Unremarkable and noncontributory    Review of Systems:    A comprehensive review of systems was negative except for that written in the History of Present Illness.      Objective:     Visit Vitals  /83 (BP 1 Location: Right upper arm, BP Patient Position: At rest)   Pulse 94   Temp 98.4 °F (36.9 °C)   Resp (!) 32   Ht 6' (1.829 m)   Wt 65.7 kg (144 lb 13.5 oz)   SpO2 92%   BMI 19.64 kg/m²       Physical Exam:    GENERAL: alert, cooperative, mild distress, appears stated age, EYE: negative findings: anicteric sclera, LYMPH NODES: Inguinal adenopathy none  , THROAT & NECK: mucous membranes dry, LUNG: clear to auscultation bilaterally, HEART: regular rate and rhythm, ABDOMEN: distended, diffusely tender with rebound, EXTREMITIES:  extremities normal, atraumatic, no cyanosis or edema, SKIN: no rash or abnormalities, NEUROLOGIC: negative findings: alert, oriented x3, PSYCH: non focal    Imaging:  images and reports reviewed    Lab Review:    Recent Results (from the past 24 hour(s))   SAMPLES BEING HELD    Collection Time: 10/19/22  3:13 PM   Result Value Ref Range    SAMPLES BEING HELD 1RED     COMMENT        Add-on orders for these samples will be processed based on acceptable specimen integrity and analyte stability, which may vary by analyte. CBC WITH AUTOMATED DIFF    Collection Time: 10/19/22  3:13 PM   Result Value Ref Range    WBC 18.2 (H) 4.1 - 11.1 K/uL    RBC 4.43 4.10 - 5.70 M/uL    HGB 12.4 12.1 - 17.0 g/dL    HCT 38.3 36.6 - 50.3 %    MCV 86.5 80.0 - 99.0 FL    MCH 28.0 26.0 - 34.0 PG    MCHC 32.4 30.0 - 36.5 g/dL    RDW 13.4 11.5 - 14.5 %    PLATELET 802 997 - 184 K/uL    MPV 11.4 8.9 - 12.9 FL    NRBC 0.0 0  WBC    ABSOLUTE NRBC 0.00 0.00 - 0.01 K/uL    NEUTROPHILS 92 (H) 32 - 75 %    LYMPHOCYTES 3 (L) 12 - 49 %    MONOCYTES 4 (L) 5 - 13 %    EOSINOPHILS 0 0 - 7 %    BASOPHILS 0 0 - 1 %    IMMATURE GRANULOCYTES 1 (H) 0.0 - 0.5 %    ABS. NEUTROPHILS 16.8 (H) 1.8 - 8.0 K/UL    ABS. LYMPHOCYTES 0.5 (L) 0.8 - 3.5 K/UL    ABS. MONOCYTES 0.7 0.0 - 1.0 K/UL    ABS. EOSINOPHILS 0.0 0.0 - 0.4 K/UL    ABS. BASOPHILS 0.0 0.0 - 0.1 K/UL    ABS. IMM.  GRANS. 0.2 (H) 0.00 - 0.04 K/UL    DF SMEAR SCANNED      RBC COMMENTS NORMOCYTIC, NORMOCHROMIC     METABOLIC PANEL, COMPREHENSIVE    Collection Time: 10/19/22  3:13 PM   Result Value Ref Range    Sodium 140 136 - 145 mmol/L    Potassium 3.2 (L) 3.5 - 5.1 mmol/L    Chloride 109 (H) 97 - 108 mmol/L    CO2 24 21 - 32 mmol/L    Anion gap 7 5 - 15 mmol/L    Glucose 178 (H) 65 - 100 mg/dL    BUN 20 6 - 20 MG/DL    Creatinine 1.38 (H) 0.70 - 1.30 MG/DL    BUN/Creatinine ratio 14 12 - 20      eGFR 59 (L) >60 ml/min/1.73m2    Calcium 9.2 8.5 - 10.1 MG/DL    Bilirubin, total 0.3 0.2 - 1.0 MG/DL    ALT (SGPT) 19 12 - 78 U/L    AST (SGOT) 16 15 - 37 U/L    Alk. phosphatase 59 45 - 117 U/L    Protein, total 7.3 6.4 - 8.2 g/dL    Albumin 3.4 (L) 3.5 - 5.0 g/dL    Globulin 3.9 2.0 - 4.0 g/dL    A-G Ratio 0.9 (L) 1.1 - 2.2     LIPASE    Collection Time: 10/19/22  3:13 PM   Result Value Ref Range    Lipase 623 (H) 73 - 393 U/L       Assessment:     Abdominal pain, suspect perforated viscus, most likely peptic ulcer    Plan:     1. I recommend proceeding with Surgery:  Exploratory laparotomy. 2. Discussed aspects of surgical intervention, methods, risks (including by not limited to infection, bleeding, hematoma, and perforation of the intestines or solid organs), and the risks of general anesthetic. The patient understands the risks; any and all questions were answered to the patient's satisfaction.

## 2022-10-20 NOTE — ED NOTES
TRANSFER - OUT REPORT:    Verbal report given to Don(name) on Denis Bhat  being transferred to [facility]) for ordered procedure       Report consisted of patients Situation, Background, Assessment and   Recommendations(SBAR). Information from the following report(s) SBAR, ED Summary, Intake/Output, and MAR was reviewed with the receiving nurse. Lines:   Peripheral IV 10/19/22 Left Antecubital (Active)   Site Assessment Clean, dry, & intact 10/19/22 1512   Phlebitis Assessment 0 10/19/22 1512   Infiltration Assessment 0 10/19/22 1512   Dressing Status Clean, dry, & intact 10/19/22 1512   Dressing Type Transparent 10/19/22 1512   Hub Color/Line Status Pink 10/19/22 1512        Opportunity for questions and clarification was provided.       Patient transported with:   Registered Nurse Errol Ling RN, Margie Felder

## 2022-10-20 NOTE — BRIEF OP NOTE
Brief Postoperative Note    Patient: Ailyn Perry  YOB: 1960  MRN: [id number]    Date of Procedure: 10/19/2022     Pre-Op Diagnosis: Perforated Bowel    Post-Op Diagnosis:  perforated pyloric ulcer       Procedure(s):  LAPAROTOMY EXPLORATORY    Surgeon(s):  Rain Valdez MD    Surgical Assistant: Surg Asst-1: Frisian Gauze    Anesthesia: General     Estimated Blood Loss (mL): Minimal    Complications: None    Specimens: * No specimens in log *     Implants: * No implants in log *    Drains:   Nasogastric Tube 10/19/22 (Active)       Findings: perforated pyloric ulcer    Electronically Signed by Rosa Romero MD on 10/19/2022 at 10:25 PM

## 2022-10-20 NOTE — ANESTHESIA POSTPROCEDURE EVALUATION
Procedure(s):  Tom Frock Patch Repair of Peptic Ulcer. general    Anesthesia Post Evaluation      Multimodal analgesia: multimodal analgesia not used between 6 hours prior to anesthesia start to PACU discharge  Patient location during evaluation: PACU  Patient participation: complete - patient participated  Level of consciousness: responsive to verbal stimuli and sleepy but conscious  Pain score: 0  Pain management: adequate  Airway patency: patent  Anesthetic complications: no  Cardiovascular status: hemodynamically stable and acceptable  Respiratory status: acceptable  Hydration status: acceptable  Comments: Patient seen and evaluated; no concerns. Post anesthesia nausea and vomiting:  none      INITIAL Post-op Vital signs:   Vitals Value Taken Time   /83 10/19/22 2335   Temp 37 °C (98.6 °F) 10/19/22 2308   Pulse 94 10/19/22 2340   Resp 18 10/19/22 2340   SpO2 97 % 10/19/22 2340   Vitals shown include unvalidated device data.

## 2022-10-20 NOTE — PROGRESS NOTES
Reason for Admission:  Perforated ulcer, perforated bowel    RUR Score:     11%                Plan for utilizing home health:      Not indicated    PCP: First and Last name:  Nba Dunne MD   [facility] of Practice: [facility] current patient: Yes/No: yes   Approximate date of last visit: 2 months ago   Can you participate in a virtual visit with your PCP: yes                    Current Advanced Directive/Advance Care Plan: Full Code    Healthcare Decision Maker: NOK: Kennedi Dominguez, mother - [phone]                  Transition of Care Plan:  CM met with patient to complete initial assessment and begin discharge planning. Patient lives with the owner of the home he rents a room from at charted address, a two story house with 3 steps to enter and 13 interior stairs to the second floor. Patient is independent, drives and works full-time as a  at the [employer] in [employer]. He does not have any history of home health or DME use. Both his parents live locally and are supportive. Patient's preferred pharmacy is [facility] on [address] [facility]. S/P exploratory laparotomy, 10/19  CM to follow and assist with any discharge needs  Home when medically stable  Patient's family to transport at discharge  Outpatient follow-up    Care Management Interventions  PCP Verified by CM:  Yes  Support Systems: Parent(s)  Confirm Follow Up Transport: Family  Discharge [facility]  [facility] [place], [place]

## 2022-10-20 NOTE — PROGRESS NOTES
TRANSFER - OUT REPORT:    Verbal [facility] being transferred to PACU for ordered procedure       Report consisted of patients Situation, Background, Assessment and   Recommendations(SBAR). Information from the following report(s) SBAR was reviewed with the receiving nurse. Opportunity for questions and clarification was provided.

## 2022-10-20 NOTE — PROGRESS NOTES
General Surgery Daily Progress Note    Patient: Carli Poole MRN: [id number]  SSN: [id number]    YOB: 1960  Age: 63 y.o. Sex: male      Admit Date: 10/19/2022    POD 1 Day Post-Op    Procedure: Procedure(s):  Roanne Pu Patch Repair of Peptic Ulcer    Subjective:   Pain controlled- feels much better than yesterday  No n/v  NGT in place      Current Facility-Administered Medications   Medication Dose Route Frequency    enoxaparin (LOVENOX) injection 40 mg  40 mg SubCUTAneous Q24H    potassium chloride 10 mEq in 100 ml IVPB  10 mEq IntraVENous Q1H    phenol throat spray (CHLORASEPTIC) 1 Spray  1 Spray Oral PRN    hydrALAZINE (APRESOLINE) 20 mg/mL injection 20 mg  20 mg IntraVENous Q6H PRN    lactated Ringers infusion  125 mL/hr IntraVENous CONTINUOUS    fluconazole (DIFLUCAN) 400mg/200 mL IVPB (premix)  400 mg IntraVENous Q24H    ondansetron (ZOFRAN) injection 4 mg  4 mg IntraVENous Q6H PRN    pantoprazole (PROTONIX) 40 mg in 0.9% sodium chloride 10 mL injection  40 mg IntraVENous Q12H    HYDROmorphone (PF) (DILAUDID) injection 1 mg  1 mg IntraVENous Q3H PRN    piperacillin-tazobactam (ZOSYN) 3.375 g in 0.9% sodium chloride (MBP/ADV) 100 mL MBP  3.375 g IntraVENous Q8H        Objective:   No intake/output data recorded. 10/18 1901 - 10/20 0700  In: 2000 [I.V.:2000]  Out: 985 [Urine:900; Drains:70]  Patient Vitals for the past 8 hrs:   BP Temp Pulse Resp SpO2   10/20/22 1120 (!) 146/80 97.9 °F (36.6 °C) 87 17 96 %   10/20/22 0959 (!) 146/85 97.7 °F (36.5 °C) 92 18 96 %       Physical Exam:  General: Alert, cooperative, NAD  Lungs: Unlabored  Heart:  RRR  Abdomen: Soft, tender as expected, distended as expected. Incisions c/d/I. NGT green/yellow output. Drains serosang.   Extremities: Warm, moves all, no edema  Skin:  Warm and dry, no rash    Labs:   Recent Labs     10/20/22  0320   WBC 17.9*   HGB 11.4*   HCT 35.1*        Recent Labs     10/20/22  0320 10/19/22  1513   * 140   K 3.0* 3.2*   * 109*   CO2 23 24   * 178*   BUN 18 20   CREA 1.21 1.38*   CA 8.3* 9.2   ALB  --  3.4*   TBILI  --  0.3   ALT  --  19       Assessment / Plan:     POD 1 Day Post-Op    Procedure: Procedure(s):  Peter Patch Repair of Peptic Ulcer       Keep NPO  NGT  Will start clears on POD 3 and assess drain output.     IVF  Antibx  PPI IV  Hold oral home meds  IV hydralazine prn for elevated Bps  Continue current pain regimen  Chloraseptic for throat pain  Replete K IV    Pt seen and discussed with Dr Jose Romero, NP

## 2022-10-20 NOTE — PROGRESS NOTES
TRANSFER - OUT REPORT:    Verbal report given to Daniel Lama on Pratik Holman being transferred to room 404; 4th floor for routine post - op       Report consisted of patients Situation, Background, Assessment and   Recommendations(SBAR). Information from the following report(s) SBAR was reviewed with the receiving nurse. Opportunity for questions and clarification was provided.

## 2022-10-20 NOTE — ANESTHESIA PREPROCEDURE EVALUATION
Relevant Problems   No relevant active problems       Anesthetic History   No history of anesthetic complications            Review of Systems / Medical History  Patient summary reviewed, nursing notes reviewed and pertinent labs reviewed    Pulmonary  Within defined limits                 Neuro/Psych             Comments: Chronic back pain Cardiovascular  Within defined limits                Exercise tolerance: >4 METS     GI/Hepatic/Renal         Renal disease: CRI       Endo/Other        Arthritis     Other Findings                   Anesthetic Plan    ASA: 2, emergent  Anesthesia type: general          Induction: Intravenous  Anesthetic plan and risks discussed with: Patient      Informed consent obtained.

## 2022-10-20 NOTE — OP NOTES
[facility]at Thurston [facility]  OPERATIVE REPORT    Name:  Elsa Coles  MR#:  [id number]  :  1960  ACCOUNT #:  [de-identified]  DATE OF SERVICE:  10/19/2022    PREOPERATIVE DIAGNOSIS:  Perforated ulcer. POSTOPERATIVE DIAGNOSIS:  Perforated pyloric ulcer. PROCEDURE PERFORMED:  Exploratory laparotomy with Georgina Hero patch repair of ulcer. SURGEON:  Jaylin Das. Lexa Babb MD    ASSISTANT:      ANESTHESIA:  General endotracheal.    COMPLICATIONS:  None. SPECIMENS REMOVED:  None. IMPLANTS:  None. ESTIMATED BLOOD LOSS:  Minimal.    DRAINS:  SKYE drain in the epigastric area. DESCRIPTION OF PROCEDURE:  The patient was brought to the operating room and placed supine on the table. SCDs were placed on bilateral lower extremities. General endotracheal anesthesia was administered without difficulty. The abdomen was prepped and draped in usual sterile fashion. IV Zosyn had already been given. After a time-out was performed, upper midline incision was made and carried down through the subcutaneous tissue and fascia. The peritoneum then grasped and incised sharply. A full suction device was then used to aspirate thick, mucousy, bilious drainage from the upper abdomen. All four quadrants were suctioned. The remainder of the wound was then opened. The anterior surface of the stomach was inspected and found to be intact. At the pylorus, there was an approximately 8 cm ulcer with bilious drainage. This was repaired primarily with four interrupted 3-0 silk sutures. The tissue here was quite friable but was able to hold stitches, though it was not an airtight seal.  The area was then patched with a tongue of omentum that was tacked in place with interrupted 3-0 silk sutures. At that point, there was good approximation of tissue. There was no further drainage. An NG tube placement was checked and found to be in the appropriate position.   The abdomen was then irrigated with normal saline and that was all aspirated from the abdomen. A SKYE drain was then placed in the left upper quadrant and tucked under the liver, near the pylorus and sutured in place. The fascia was then closed with running 0 looped PDS suture and skin approximated with 4-0 Monocryl subcuticular sutures. Dermabond was applied. The patient tolerated the procedure well without complication.       Anne Beltrán MD      [id number]  D:  10/19/2022 22:34  T:  10/20/2022 3:32  JOB #:  [id number]

## 2022-10-20 NOTE — PROGRESS NOTES
Comprehensive Nutrition Assessment    Type and Reason for Visit: Initial, Positive nutrition screen    Nutrition Recommendations/Plan:   Continue NPO per Surgery - advance as able to Low Fiber. Add Ensure Clear and advance to Ensure Enlive TID as able - any/all flavors  Monitor GI status     Malnutrition Assessment:  Malnutrition Status: Moderate malnutrition (10/20/22 1311)    Context:  Acute illness     Findings of the 6 clinical characteristics of malnutrition:   Energy Intake:  75% or less of est energy req for 7 or more days  Weight Loss:  Greater than 5% over 1 month     Body Fat Loss:  Mild body fat loss,     Muscle Mass Loss:  Mild muscle mass loss,    Fluid Accumulation:  No significant fluid accumulation,     Strength:  Not performed     Nutrition Assessment:         Pt admitted with Perforated bowel ([facility] [address] [K63.1]  Perforated ulcer ([facility] [address] [K27.5]    History reviewed. No pertinent past medical history. Pt POD 1 gastric ulcer repair. Pt NPO until likely Saturday or Sunday to monitor for any additional bleeding ulcers. Pt agreeable to Ensure any/all flavors as able for added protein and kcal. Pt reports -170 lbs. MST received for unsure wt loss. Bedscale with SCD pump removed was 148.5 lbs on visit. Pt states he was eating less over the course of the last 4-6 weeks due to stomach pain. Pt was eating ~50% of normal PO intake for this time frame. No known food allergies. No chew/swallow difficulties. Last BM 10/17 PTA. Monitor BG - likely elevated from pain. NGT currently in place. Last BM: 10/17/22, Formed    PO intake: No data found.     Wt Readings from Last 30 Encounters:   10/20/22 67.4 kg (148 lb 8 oz)       Meds:   Current Facility-Administered Medications   Medication Dose Route Frequency    enoxaparin (LOVENOX) injection 40 mg  40 mg SubCUTAneous Q24H    potassium chloride 10 mEq in 100 ml IVPB  10 mEq IntraVENous Q1H    lactated Ringers infusion  125 mL/hr IntraVENous CONTINUOUS fluconazole (DIFLUCAN) 400mg/200 mL IVPB (premix)  400 mg IntraVENous Q24H    pantoprazole (PROTONIX) 40 mg in 0.9% sodium chloride 10 mL injection  40 mg IntraVENous Q12H    piperacillin-tazobactam (ZOSYN) 3.375 g in 0.9% sodium chloride (MBP/ADV) 100 mL MBP  3.375 g IntraVENous Q8H       Labs:  Recent Labs     10/20/22  0320 10/19/22  1513   * 178*   BUN 18 20   CREA 1.21 1.38*   * 140   K 3.0* 3.2*   * 109*   CO2 23 24   CA 8.3* 9.2       Recent Labs     10/19/22  1513   ALT 19   AST 16   AP 59   TBILI 0.3   TP 7.3   ALB 3.4*   GLOB 3.9   LPSE 623*         Recent Labs     10/20/22  0320 10/19/22  1513   WBC 17.9* 18.2*   HGB 11.4* 12.4   HCT 35.1* 38.3    394           Nutrition Related Findings:      Wound Type: Surgical incision    Edema: No data recorded      Current Nutrition Intake & Therapies:  Average Meal Intake: NPO  Average Supplement Intake: NPO  DIET NPO    Anthropometric Measures:  Height: 6' 0.01\" (182.9 cm)  Ideal Body Weight (IBW): 178 lbs (81 kg)     Current Body Wt:  67.4 kg (148 lb 8 oz), 83.4 % IBW.  Bed scale  Current BMI (kg/m2): 20.1  Usual Body Weight: 74.8 kg (165 lb)  % Weight Change (Calculated): -10  Weight Adjustment: No adjustment                      Estimated Daily Nutrient Needs:  Energy Requirements Based On: Kcal/kg  Weight Used for Energy Requirements: Current  Energy (kcal/day): 2272 (30 g/kg) +250  Weight Used for Protein Requirements: Current  Protein (g/day): 88  Method Used for Fluid Requirements: 1 ml/kcal  Fluid (ml/day): 3874    Nutrition Diagnosis:   Inadequate protein-energy intake related to altered GI function as evidenced by NPO or clear liquid status due to medical condition, weight loss  Moderate malnutrition related to inadequate protein-energy intake as evidenced by weight loss greater than or equal to 5% in 1 month, mild muscle loss, mild loss of subcutaneous fat    Nutrition Interventions:   Food and/or Nutrient Delivery: Start oral nutrition supplement, Start oral diet  Nutrition Education/Counseling: No recommendations at this time  Coordination of Nutrition Care: Continue to monitor while inpatient, Interdisciplinary rounds       Goals:     Goals: PO intake 50% or greater, by next RD assessment       Nutrition Monitoring and Evaluation:   Behavioral-Environmental Outcomes: None identified  Food/Nutrient Intake Outcomes: Food and nutrient intake, Supplement intake  Physical Signs/Symptoms Outcomes: Biochemical data, Weight, Skin, GI status    Discharge Planning:     Too soon to determine    Dominic Del Real, [address]: [phone]

## 2022-10-21 LAB
COMMENT, HOLDF: NORMAL
SAMPLES BEING HELD,HOLD: NORMAL

## 2022-10-21 PROCEDURE — 97530 THERAPEUTIC ACTIVITIES: CPT

## 2022-10-21 PROCEDURE — 74011000258 HC RX REV CODE- 258: Performed by: SURGERY

## 2022-10-21 PROCEDURE — 74011250636 HC RX REV CODE- 250/636: Performed by: NURSE PRACTITIONER

## 2022-10-21 PROCEDURE — 74011250636 HC RX REV CODE- 250/636: Performed by: SURGERY

## 2022-10-21 PROCEDURE — 65270000029 HC RM PRIVATE

## 2022-10-21 PROCEDURE — 97162 PT EVAL MOD COMPLEX 30 MIN: CPT

## 2022-10-21 PROCEDURE — C9113 INJ PANTOPRAZOLE SODIUM, VIA: HCPCS | Performed by: SURGERY

## 2022-10-21 PROCEDURE — 74011000250 HC RX REV CODE- 250: Performed by: SURGERY

## 2022-10-21 RX ADMIN — HYDROMORPHONE HYDROCHLORIDE 1 MG: 1 INJECTION, SOLUTION INTRAMUSCULAR; INTRAVENOUS; SUBCUTANEOUS at 08:45

## 2022-10-21 RX ADMIN — HYDRALAZINE HYDROCHLORIDE 20 MG: 20 INJECTION INTRAMUSCULAR; INTRAVENOUS at 20:12

## 2022-10-21 RX ADMIN — PIPERACILLIN AND TAZOBACTAM 3.38 G: 3; .375 INJECTION, POWDER, LYOPHILIZED, FOR SOLUTION INTRAVENOUS at 13:41

## 2022-10-21 RX ADMIN — FLUCONAZOLE 400 MG: 2 INJECTION, SOLUTION INTRAVENOUS at 20:13

## 2022-10-21 RX ADMIN — ONDANSETRON 4 MG: 2 INJECTION INTRAMUSCULAR; INTRAVENOUS at 19:33

## 2022-10-21 RX ADMIN — ENOXAPARIN SODIUM 40 MG: 100 INJECTION SUBCUTANEOUS at 08:45

## 2022-10-21 RX ADMIN — SODIUM CHLORIDE 40 MG: 9 INJECTION INTRAMUSCULAR; INTRAVENOUS; SUBCUTANEOUS at 08:45

## 2022-10-21 RX ADMIN — PIPERACILLIN AND TAZOBACTAM 3.38 G: 3; .375 INJECTION, POWDER, LYOPHILIZED, FOR SOLUTION INTRAVENOUS at 04:08

## 2022-10-21 RX ADMIN — SODIUM CHLORIDE 40 MG: 9 INJECTION INTRAMUSCULAR; INTRAVENOUS; SUBCUTANEOUS at 20:12

## 2022-10-21 RX ADMIN — PIPERACILLIN AND TAZOBACTAM 3.38 G: 3; .375 INJECTION, POWDER, LYOPHILIZED, FOR SOLUTION INTRAVENOUS at 20:13

## 2022-10-21 RX ADMIN — HYDROMORPHONE HYDROCHLORIDE 1 MG: 1 INJECTION, SOLUTION INTRAMUSCULAR; INTRAVENOUS; SUBCUTANEOUS at 19:33

## 2022-10-21 RX ADMIN — SODIUM CHLORIDE, POTASSIUM CHLORIDE, SODIUM LACTATE AND CALCIUM CHLORIDE 125 ML/HR: 600; 310; 30; 20 INJECTION, SOLUTION INTRAVENOUS at 05:47

## 2022-10-21 NOTE — PROGRESS NOTES
Problem: General Medical Care Plan  Goal: *Vital signs within specified parameters  Outcome: Progressing Towards Goal  Goal: *Labs within defined limits  Outcome: Progressing Towards Goal  Goal: *Absence of infection signs and symptoms  Outcome: Progressing Towards Goal  Goal: *Optimal pain control at patient's stated goal  Outcome: Progressing Towards Goal  Goal: *Skin integrity maintained  Outcome: Progressing Towards Goal  Goal: *Fluid volume balance  Outcome: Progressing Towards Goal  Goal: *Optimize nutritional status  Outcome: Progressing Towards Goal  Goal: *Anxiety reduced or absent  Outcome: Progressing Towards Goal  Goal: *Progressive mobility and function (eg: ADL's)  Outcome: Progressing Towards Goal     Problem: Patient Education: Go to Patient Education Activity  Goal: Patient/Family Education  Outcome: Progressing Towards Goal     Problem: Pressure Injury - Risk of  Goal: *Prevention of pressure injury  Description: Document Zia Scale and appropriate interventions in the flowsheet. Outcome: Progressing Towards Goal  Note: Pressure Injury Interventions:  Sensory Interventions: Assess changes in LOC    Moisture Interventions: Absorbent underpads    Activity Interventions: PT/OT evaluation    Mobility Interventions: Turn and reposition approx. every two hours(pillow and wedges)         Friction and Shear Interventions: Transfer aides:transfer board/Johny lift/ceiling lift                Problem: Patient Education: Go to Patient Education Activity  Goal: Patient/Family Education  Outcome: Progressing Towards Goal     Problem: Falls - Risk of  Goal: *Absence of Falls  Description: Document Connie Fall Risk and appropriate interventions in the flowsheet.   Outcome: Progressing Towards Goal  Note: Fall Risk Interventions:  Mobility Interventions: Patient to call before getting OOB         Medication Interventions: Patient to call before getting OOB, Teach patient to arise slowly    Elimination Interventions: Call light in reach, Patient to call for help with toileting needs    History of Falls Interventions:  Investigate reason for fall, Room close to nurse's station         Problem: Patient Education: Go to Patient Education Activity  Goal: Patient/Family Education  Outcome: Progressing Towards Goal

## 2022-10-21 NOTE — PROGRESS NOTES
General Surgery Daily Progress Note    Patient: Cathy Banks MRN: [id number]  SSN: [id number]    YOB: 1960  Age: 63 y.o. Sex: male      Admit Date: 10/19/2022    POD 2 Days Post-Op    Procedure: Procedure(s):  Blaze Kind Patch Repair of Peptic Ulcer    Subjective:   Abd pain reported today  No n/v  Concerns about long term pain control for dx spinal stenosis/back pain- pt was using ibuprofen on a regular basis as told to do by ortho surgeon, concerned about \"harder meds\" since recovered alcoholic. Ordered labs at 0745am.  Spoke with RN to please obtain. Still no labs at 1145. Current Facility-Administered Medications   Medication Dose Route Frequency    enoxaparin (LOVENOX) injection 40 mg  40 mg SubCUTAneous Q24H    phenol throat spray (CHLORASEPTIC) 1 Spray  1 Spray Oral PRN    hydrALAZINE (APRESOLINE) 20 mg/mL injection 20 mg  20 mg IntraVENous Q6H PRN    lactated Ringers infusion  125 mL/hr IntraVENous CONTINUOUS    fluconazole (DIFLUCAN) 400mg/200 mL IVPB (premix)  400 mg IntraVENous Q24H    ondansetron (ZOFRAN) injection 4 mg  4 mg IntraVENous Q6H PRN    pantoprazole (PROTONIX) 40 mg in 0.9% sodium chloride 10 mL injection  40 mg IntraVENous Q12H    HYDROmorphone (PF) (DILAUDID) injection 1 mg  1 mg IntraVENous Q3H PRN    piperacillin-tazobactam (ZOSYN) 3.375 g in 0.9% sodium chloride (MBP/ADV) 100 mL MBP  3.375 g IntraVENous Q8H        Objective:   10/21 0701 - 10/21 1900  In: 0   Out: 400 [Urine:400]  10/19 1901 - 10/21 0700  In: 7291 [I.V.:7020]  Out: 2695 [Urine:2100; Drains:530]  Patient Vitals for the past 8 hrs:   BP Temp Pulse Resp SpO2   10/21/22 0925 (!) 150/77 98.7 °F (37.1 °C) 89 16 91 %   10/21/22 0325 (!) 159/88 98.1 °F (36.7 °C) 88 17 98 %       Physical Exam:  General: Alert, cooperative, NAD  Lungs: Unlabored  Heart:  RRR  Abdomen: Soft, tender, non-distended. Incisions c/d/I, NGT in place with thick yellowish green output stuck in tube, SKYE serosang. Extremities: Warm, moves all, no edema  Skin:  Warm and dry, no rash    Labs:   Recent Labs     10/20/22  0320   WBC 17.9*   HGB 11.4*   HCT 35.1*        Recent Labs     10/20/22  0320 10/19/22  1513   * 140   K 3.0* 3.2*   * 109*   CO2 23 24   * 178*   BUN 18 20   CREA 1.21 1.38*   CA 8.3* 9.2   ALB  --  3.4*   TBILI  --  0.3   ALT  --  19       Assessment / Plan:     POD 2 Days Post-Op    Procedure: Procedure(s):  Peter Patch Repair of Peptic Ulcer     Await Labs  Keep NPO  NGT  Will start clears on POD 3 and assess drain output. IVF  Antibx  PPI IV  Hold oral home meds  IV hydralazine prn for elevated Bps  Continue current pain regimen  Obtain abd binder to help with pain  Will talk with palliative and see if there are any options for home pain control now that pt cannot take NSAIDs and has hx of alcohol use disorder  Must Ambulate  IS hourly    Pt seen and discussed with Dr Bernadine Simms, NP    Discussed with weekend radiologist.  Given multiple constraints with staffing, would not be reliably able to perform usual swallow study. Will likely clamp NGT, start clears. If drain output changes character or there is new pain, will need to continue with current management. Gave patient a list of outpatient pain specialist to help with ongoing back pain after fusion. He was reminded to completely NSAIDs/aspirin.

## 2022-10-21 NOTE — PROGRESS NOTES
10/21/2022  10:00 AM  Care Management Progress Note      ICD-10-CM ICD-9-CM    1. Small bowel perforation (HCC)  K63.1 569.83       2. Abdominal pain, generalized  R10.84 789.07           RUR:  11%  Risk Level: [x]Low []Moderate []High  Value-based purchasing: [] Yes [x] No  Bundle patient: [] Yes [x] No   Specify:     Transition of care plan:  Awaiting medical clearance and Dc order. Pt NPO with NG Tube. Home with family assistance as needed. Outpatient follow-up. Pt's family to transport.

## 2022-10-21 NOTE — PROGRESS NOTES
Problem: Mobility Impaired (Adult and Pediatric)  Goal: *Acute Goals and Plan of Care (Insert Text)  Description: FUNCTIONAL STATUS PRIOR TO ADMISSION: Patient was independent and active without use of DME.    HOME SUPPORT PRIOR TO ADMISSION: The patient lived with roommates but did not require assist. His parents live locally. Physical Therapy Goals  Initiated 10/21/2022  1. Patient will move from supine to sit and sit to supine , scoot up and down, and roll side to side in bed with independence within 7 day(s). 2.  Patient will transfer from bed to chair and chair to bed with modified independence using the least restrictive device within 7 day(s). 3.  Patient will perform sit to stand with modified independence within 7 day(s). 4.  Patient will ambulate with modified independence for 75 feet with the least restrictive device within 7 day(s). 5.  Patient will ascend/descend 6 stairs with 1 handrail(s) with minimal assistance/contact guard assist within 7 day(s). Outcome: Not Met  PHYSICAL THERAPY EVALUATION  Patient: Shon Haskins (88 y.o. male)  Date: 10/21/2022  Primary Diagnosis: Perforated bowel ([facility] [address] [K63.1]  Perforated ulcer ([facility] [address] [K27.5]  Procedure(s) (LRB):  Peter Patch Repair of Peptic Ulcer (N/A) 2 Days Post-Op   Precautions:   Fall (NGT; KSYE drain)      ASSESSMENT  Based on the objective data described below, the patient presents with reduced LE strength, impaired balance, high risk of falls, poor activity tolerance and overall reduced functional mobility in the setting of hospital admission for perforated ulcer now s/p gram patch repair POD2. Patient this afternoon received on 2L NC, NC out of nose when PT arrived, as well as L IV pulled out. Patient spo2 stable on room air 90-92% so removed at conclusion of session. Patient requires Maida for bed mobility and minAx2 for transfers and short distance ambulation to bedside chair with RW.  He reports baseline RLE > LLE weakness and during ambulation he self-selects toe touch/partial weight bearing of RLE. When prompted to fully weight bear on RLE, patient with poor motor/quad control of knee extension with his knee thrown in to hyperextension with each RLE stance phase. Patient reports nausea upon sitting up in the chair. Orthostatics stable. RN alerted. At this time, patient is far below his independent functional baseline and does not have physical support available at home. Recommend SNF level rehab. For the weekend, recommend patient to complete as able in order to maintain strength, endurance and independence with nursing: OOB to chair 3x/day with RW and ambulating with assist x 2 for bedside transfers. PT to follow-up with patient after the weekend. Current Level of Function Impacting Discharge (mobility/balance): minAx2    Functional Outcome Measure: The patient scored Total Score: 6/28 on the Tinetti outcome measure which is indicative of high fall risk. Other factors to consider for discharge: per chart, patient lives in group home     Patient will benefit from skilled therapy intervention to address the above noted impairments. PLAN :  Recommendations and Planned Interventions: bed mobility training, transfer training, gait training, therapeutic exercises, patient and family training/education, and therapeutic activities      Frequency/Duration: Patient will be followed by physical therapy:  5 times a week to address goals. Recommendation for discharge: (in order for the patient to meet his/her long term goals)  Therapy up to 5 days/week in SNF setting    This discharge recommendation:  Has not yet been discussed the attending provider and/or case management; spoke with patient regarding recommendation    IF patient discharges home will need the following DME: rolling walker         SUBJECTIVE:   Patient stated That would be good.  re: RW    OBJECTIVE DATA SUMMARY:   Patient received supine in bed and was agreeable to participate in PT session. Patient was cleared by nursing to participate in PT session. Vitals:    10/21/22 1128 10/21/22 1512 10/21/22 1525   BP: (!) 160/77 (!) 168/90 (!) (P) 166/84   BP 1 Location: Right upper arm Left upper arm (P) Left upper arm   BP Patient Position: At rest Sitting (P) Sitting  Comment: post transfer   Pulse: 90 91 (P) 93   Temp: 98.7 °F (37.1 °C)     Resp: 16     Height:      Weight:      SpO2: 93% 91% (P) 90%         HISTORY:    History reviewed. No pertinent past medical history. History reviewed. No pertinent surgical history. Personal factors and/or comorbidities impacting plan of care: none additional    Home Situation  Home Environment: Private residence  # Steps to Enter: 3  Rails to Enter: Yes  One/Two Story Residence: Two story  # of Interior Steps: 15  Interior Rails: Left  Living Alone: No  Support Systems: Parent(s) (roommates)  Patient Expects to be Discharged to[de-identified] Home  Current DME Used/Available at Home: Cane, straight, Raised toilet seat  Tub or Shower Type: Tub/Shower combination    EXAMINATION/PRESENTATION/DECISION MAKING:   Critical Behavior:  Neurologic State: Alert  Orientation Level: Oriented X4        Hearing: Auditory  Auditory Impairment: None  Skin:  + SKYE drain; + NGT on suction; abdominal incision intact and clean  Edema: without  Range Of Motion:  AROM: Generally decreased, functional                       Strength:    Strength: Generally decreased, functional                    Tone & Sensation:   Tone: Normal                              Coordination:  Coordination: Within functional limits  Vision:      Functional Mobility:  Bed Mobility:  Rolling: Contact guard assistance  Supine to Sit: Minimum assistance     Scooting: Contact guard assistance  Transfers:  Sit to Stand: Minimum assistance;Assist x2  Stand to Sit: Minimum assistance;Assist x2        Bed to Chair: Minimum assistance;Assist x2              Balance:   Sitting: Intact; With support  Standing: Impaired; With support  Standing - Static: Fair;Constant support  Standing - Dynamic : Fair;Constant support  Ambulation/Gait Training:  Distance (ft): 3 Feet (ft)  Assistive Device: Gait belt;Walker, rolling  Ambulation - Level of Assistance: Minimal assistance;Assist x2        Gait Abnormalities: Step to gait; Decreased step clearance;Trunk sway increased        Base of Support: Shift to left (patitent self-selects partial WBing RLE)     Speed/Sue: Pace decreased (<100 feet/min)                     Therapeutic Exercises:       Functional Measure:  Tinetti test:    Sitting Balance: 1  Arises: 0  Attempts to Rise: 0  Immediate Standing Balance: 1  Standing Balance: 0  Nudged: 0  Eyes Closed: 0  Turn 360 Degrees - Continuous/Discontinuous: 0  Turn 360 Degrees - Steady/Unsteady: 1  Sitting Down: 0  Balance Score: 3 Balance total score  Indication of Gait: 1  R Step Length/Height: 0  L Step Length/Height: 0  R Foot Clearance: 1  L Foot Clearance: 1  Step Symmetry: 0  Step Continuity: 0  Path: 0  Trunk: 0  Walking Time: 0  Gait Score: 3 Gait total score  Total Score: 6/28 Overall total score         Tinetti Tool Score Risk of Falls  <19 = High Fall Risk  19-24 = Moderate Fall Risk  25-28 = Low Fall Risk  Tinetti ME. Performance-Oriented Assessment of Mobility Problems in Elderly Patients. Ribeiro 66; [id number].  (Scoring Description: PT Bulletin Feb. 10, 1993)    Older adults: Suki Solis et al, 2009; n = 1000 [facility] elderly evaluated with ABC, DEMAR, ADL, and IADL)  · Mean DEMAR score for males aged 69-68 years = 26.21(3.40)  · Mean DEMAR score for females age 69-68 years = 25.16(4.30)  · Mean DEMAR score for males over 80 years = 23.29(6.02)  · Mean DEMAR score for females over 80 years = 17.20(8.32)        Physical Therapy Evaluation Charge Determination   History Examination Presentation Decision-Making   MEDIUM  Complexity : 1-2 comorbidities / personal factors will impact the outcome/ POC  MEDIUM Complexity : 3 Standardized tests and measures addressing body structure, function, activity limitation and / or participation in recreation  MEDIUM Complexity : Evolving with changing characteristics  MEDIUM Complexity : FOTO score of 26-74      Based on the above components, the patient evaluation is determined to be of the following complexity level: MEDIUM    Pain Rating:  Patient with complaints of abdominal pain during functional mobility    Activity Tolerance:   Fair, SpO2 stable on RA, and requires rest breaks      After treatment patient left in no apparent distress:   Sitting in chair, Heels elevated for pressure relief, Call bell within reach, and Bed / chair alarm activated    COMMUNICATION/EDUCATION:   The patients plan of care was discussed with: Registered nurse and Rehabilitation technician. Fall prevention education was provided and the patient/caregiver indicated understanding. and Patient/family have participated as able in goal setting and plan of care.     Thank you for this referral.  Alice Leach PT, DPT   Time Calculation: 33 mins

## 2022-10-22 LAB
ANION GAP SERPL CALC-SCNC: 11 MMOL/L (ref 5–15)
BASOPHILS # BLD: 0 K/UL (ref 0–0.1)
BASOPHILS NFR BLD: 0 % (ref 0–1)
BUN SERPL-MCNC: 20 MG/DL (ref 6–20)
BUN/CREAT SERPL: 22 (ref 12–20)
CALCIUM SERPL-MCNC: 9 MG/DL (ref 8.5–10.1)
CHLORIDE SERPL-SCNC: 114 MMOL/L (ref 97–108)
CO2 SERPL-SCNC: 22 MMOL/L (ref 21–32)
CREAT SERPL-MCNC: 0.92 MG/DL (ref 0.7–1.3)
DIFFERENTIAL METHOD BLD: ABNORMAL
EOSINOPHIL # BLD: 0.1 K/UL (ref 0–0.4)
EOSINOPHIL NFR BLD: 1 % (ref 0–7)
ERYTHROCYTE [DISTWIDTH] IN BLOOD BY AUTOMATED COUNT: 13.9 % (ref 11.5–14.5)
GLUCOSE SERPL-MCNC: 115 MG/DL (ref 65–100)
HCT VFR BLD AUTO: 35.5 % (ref 36.6–50.3)
HGB BLD-MCNC: 11.4 G/DL (ref 12.1–17)
IMM GRANULOCYTES # BLD AUTO: 0.1 K/UL (ref 0–0.04)
IMM GRANULOCYTES NFR BLD AUTO: 1 % (ref 0–0.5)
LYMPHOCYTES # BLD: 0.6 K/UL (ref 0.8–3.5)
LYMPHOCYTES NFR BLD: 3 % (ref 12–49)
MCH RBC QN AUTO: 28.1 PG (ref 26–34)
MCHC RBC AUTO-ENTMCNC: 32.1 G/DL (ref 30–36.5)
MCV RBC AUTO: 87.7 FL (ref 80–99)
MONOCYTES # BLD: 0.4 K/UL (ref 0–1)
MONOCYTES NFR BLD: 2 % (ref 5–13)
NEUTS SEG # BLD: 16 K/UL (ref 1.8–8)
NEUTS SEG NFR BLD: 93 % (ref 32–75)
NRBC # BLD: 0 K/UL (ref 0–0.01)
NRBC BLD-RTO: 0 PER 100 WBC
PLATELET # BLD AUTO: 338 K/UL (ref 150–400)
PMV BLD AUTO: 11.9 FL (ref 8.9–12.9)
POTASSIUM SERPL-SCNC: 2.8 MMOL/L (ref 3.5–5.1)
RBC # BLD AUTO: 4.05 M/UL (ref 4.1–5.7)
SODIUM SERPL-SCNC: 147 MMOL/L (ref 136–145)
WBC # BLD AUTO: 17.3 K/UL (ref 4.1–11.1)

## 2022-10-22 PROCEDURE — 80048 BASIC METABOLIC PNL TOTAL CA: CPT

## 2022-10-22 PROCEDURE — C9113 INJ PANTOPRAZOLE SODIUM, VIA: HCPCS | Performed by: SURGERY

## 2022-10-22 PROCEDURE — 74011250636 HC RX REV CODE- 250/636: Performed by: NURSE PRACTITIONER

## 2022-10-22 PROCEDURE — 74011000258 HC RX REV CODE- 258: Performed by: SURGERY

## 2022-10-22 PROCEDURE — 85025 COMPLETE CBC W/AUTO DIFF WBC: CPT

## 2022-10-22 PROCEDURE — 74011250636 HC RX REV CODE- 250/636: Performed by: SURGERY

## 2022-10-22 PROCEDURE — 74011000250 HC RX REV CODE- 250: Performed by: SURGERY

## 2022-10-22 PROCEDURE — 36415 COLL VENOUS BLD VENIPUNCTURE: CPT

## 2022-10-22 PROCEDURE — 65270000029 HC RM PRIVATE

## 2022-10-22 RX ADMIN — HYDRALAZINE HYDROCHLORIDE 20 MG: 20 INJECTION INTRAMUSCULAR; INTRAVENOUS at 03:38

## 2022-10-22 RX ADMIN — FLUCONAZOLE 400 MG: 2 INJECTION, SOLUTION INTRAVENOUS at 20:28

## 2022-10-22 RX ADMIN — PIPERACILLIN AND TAZOBACTAM 3.38 G: 3; .375 INJECTION, POWDER, LYOPHILIZED, FOR SOLUTION INTRAVENOUS at 03:38

## 2022-10-22 RX ADMIN — PIPERACILLIN AND TAZOBACTAM 3.38 G: 3; .375 INJECTION, POWDER, LYOPHILIZED, FOR SOLUTION INTRAVENOUS at 20:28

## 2022-10-22 RX ADMIN — HYDROMORPHONE HYDROCHLORIDE 1 MG: 1 INJECTION, SOLUTION INTRAMUSCULAR; INTRAVENOUS; SUBCUTANEOUS at 13:08

## 2022-10-22 RX ADMIN — HYDROMORPHONE HYDROCHLORIDE 1 MG: 1 INJECTION, SOLUTION INTRAMUSCULAR; INTRAVENOUS; SUBCUTANEOUS at 18:09

## 2022-10-22 RX ADMIN — SODIUM CHLORIDE 40 MG: 9 INJECTION INTRAMUSCULAR; INTRAVENOUS; SUBCUTANEOUS at 20:28

## 2022-10-22 RX ADMIN — SODIUM CHLORIDE 40 MG: 9 INJECTION INTRAMUSCULAR; INTRAVENOUS; SUBCUTANEOUS at 11:29

## 2022-10-22 RX ADMIN — PIPERACILLIN AND TAZOBACTAM 3.38 G: 3; .375 INJECTION, POWDER, LYOPHILIZED, FOR SOLUTION INTRAVENOUS at 11:29

## 2022-10-22 RX ADMIN — HYDROMORPHONE HYDROCHLORIDE 1 MG: 1 INJECTION, SOLUTION INTRAMUSCULAR; INTRAVENOUS; SUBCUTANEOUS at 06:02

## 2022-10-22 RX ADMIN — ENOXAPARIN SODIUM 40 MG: 100 INJECTION SUBCUTANEOUS at 11:29

## 2022-10-22 NOTE — PROGRESS NOTES
Problem: Pressure Injury - Risk of  Goal: *Prevention of pressure injury  Description: Document Zia Scale and appropriate interventions in the flowsheet. Outcome: Progressing Towards Goal  Note: Pressure Injury Interventions:  Sensory Interventions: Float heels, Minimize linen layers    Moisture Interventions: Absorbent underpads, Minimize layers    Activity Interventions: Increase time out of bed, PT/OT evaluation    Mobility Interventions: Float heels, HOB 30 degrees or less, PT/OT evaluation    Nutrition Interventions: Document food/fluid/supplement intake, Offer support with meals,snacks and hydration    Friction and Shear Interventions: HOB 30 degrees or less, Lift sheet, Minimize layers                Problem: Falls - Risk of  Goal: *Absence of Falls  Description: Document Connie Fall Risk and appropriate interventions in the flowsheet.   Outcome: Progressing Towards Goal  Note: Fall Risk Interventions:  Mobility Interventions: Bed/chair exit alarm, Strengthening exercises (ROM-active/passive), Utilize walker, cane, or other assistive device         Medication Interventions: Bed/chair exit alarm, Patient to call before getting OOB, Teach patient to arise slowly    Elimination Interventions: Call light in reach, Bed/chair exit alarm, Toileting schedule/hourly rounds    History of Falls Interventions: Utilize gait belt for transfer/ambulation

## 2022-10-22 NOTE — PROGRESS NOTES
Problem: General Medical Care Plan  Goal: *Vital signs within specified parameters  Outcome: Progressing Towards Goal  Goal: *Labs within defined limits  Outcome: Progressing Towards Goal  Goal: *Absence of infection signs and symptoms  Outcome: Progressing Towards Goal  Goal: *Optimal pain control at patient's stated goal  Outcome: Progressing Towards Goal  Goal: *Skin integrity maintained  Outcome: Progressing Towards Goal  Goal: *Fluid volume balance  Outcome: Progressing Towards Goal  Goal: *Optimize nutritional status  Outcome: Progressing Towards Goal  Goal: *Anxiety reduced or absent  Outcome: Progressing Towards Goal  Goal: *Progressive mobility and function (eg: ADL's)  Outcome: Progressing Towards Goal     Problem: Patient Education: Go to Patient Education Activity  Goal: Patient/Family Education  Outcome: Progressing Towards Goal     Problem: Pressure Injury - Risk of  Goal: *Prevention of pressure injury  Description: Document Zia Scale and appropriate interventions in the flowsheet. Outcome: Progressing Towards Goal  Note: Pressure Injury Interventions:  Sensory Interventions: Assess changes in LOC    Moisture Interventions: Absorbent underpads, Assess need for specialty bed, Offer toileting Q_hr    Activity Interventions: Increase time out of bed, PT/OT evaluation    Mobility Interventions: Turn and reposition approx. every two hours(pillow and wedges), Assess need for specialty bed, PT/OT evaluation    Nutrition Interventions: Document food/fluid/supplement intake    Friction and Shear Interventions: Minimize layers, Lift sheet                Problem: Patient Education: Go to Patient Education Activity  Goal: Patient/Family Education  Outcome: Progressing Towards Goal     Problem: Falls - Risk of  Goal: *Absence of Falls  Description: Document Jeevan Adams Fall Risk and appropriate interventions in the flowsheet.   Outcome: Progressing Towards Goal  Note: Fall Risk Interventions:  Mobility Interventions: Bed/chair exit alarm, Patient to call before getting OOB, Utilize walker, cane, or other assistive device, PT Consult for mobility concerns    Mentation Interventions: Door open when patient unattended    Medication Interventions: Patient to call before getting OOB, Teach patient to arise slowly    Elimination Interventions: Urinal in reach, Bed/chair exit alarm, Call light in reach, Toileting schedule/hourly rounds, Patient to call for help with toileting needs    History of Falls Interventions: Utilize gait belt for transfer/ambulation         Problem: Patient Education: Go to Patient Education Activity  Goal: Patient/Family Education  Outcome: Progressing Towards Goal     Problem: Patient Education: Go to Patient Education Activity  Goal: Patient/Family Education  Outcome: Progressing Towards Goal

## 2022-10-22 NOTE — PROGRESS NOTES
Problem: General Medical Care Plan  Goal: *Vital signs within specified parameters  Outcome: Progressing Towards Goal  Goal: *Labs within defined limits  Outcome: Progressing Towards Goal  Goal: *Absence of infection signs and symptoms  Outcome: Progressing Towards Goal  Goal: *Optimal pain control at patient's stated goal  Outcome: Progressing Towards Goal  Goal: *Skin integrity maintained  Outcome: Progressing Towards Goal  Goal: *Fluid volume balance  Outcome: Progressing Towards Goal  Goal: *Optimize nutritional status  Outcome: Progressing Towards Goal  Goal: *Anxiety reduced or absent  Outcome: Progressing Towards Goal  Goal: *Progressive mobility and function (eg: ADL's)  Outcome: Progressing Towards Goal     Problem: Patient Education: Go to Patient Education Activity  Goal: Patient/Family Education  Outcome: Progressing Towards Goal     Problem: Pressure Injury - Risk of  Goal: *Prevention of pressure injury  Description: Document Zia Scale and appropriate interventions in the flowsheet. Outcome: Progressing Towards Goal  Note: Pressure Injury Interventions:  Sensory Interventions: Float heels, Minimize linen layers    Moisture Interventions: Absorbent underpads, Minimize layers    Activity Interventions: Increase time out of bed, PT/OT evaluation    Mobility Interventions: Float heels, HOB 30 degrees or less, PT/OT evaluation    Nutrition Interventions: Document food/fluid/supplement intake, Offer support with meals,snacks and hydration    Friction and Shear Interventions: HOB 30 degrees or less, Lift sheet, Minimize layers                Problem: Patient Education: Go to Patient Education Activity  Goal: Patient/Family Education  Outcome: Progressing Towards Goal     Problem: Falls - Risk of  Goal: *Absence of Falls  Description: Document Connie Fall Risk and appropriate interventions in the flowsheet.   Outcome: Progressing Towards Goal  Note: Fall Risk Interventions:  Mobility Interventions: Bed/chair exit alarm, Strengthening exercises (ROM-active/passive), Utilize walker, cane, or other assistive device         Medication Interventions: Bed/chair exit alarm, Patient to call before getting OOB, Teach patient to arise slowly    Elimination Interventions: Call light in reach, Bed/chair exit alarm, Toileting schedule/hourly rounds    History of Falls Interventions: Utilize gait belt for transfer/ambulation         Problem: Patient Education: Go to Patient Education Activity  Goal: Patient/Family Education  Outcome: Progressing Towards Goal     Problem: Patient Education: Go to Patient Education Activity  Goal: Patient/Family Education  Outcome: Progressing Towards Goal

## 2022-10-22 NOTE — PROGRESS NOTES
Progress Note    Patient: Catia Astorga MRN: [id number]  SSN: [id number]    YOB: 1960  Age: 63 y.o. Sex: male      Admit Date: 10/19/2022    3 Days Post-Op    Procedure:  Procedure(s):  Hollace Hart Patch Repair of Peptic Ulcer    Subjective:     Patient reports pain controlled. Objective:     Visit Vitals  BP (!) 148/81   Pulse 89   Temp 97.8 °F (36.6 °C)   Resp 16   Ht 6' 0.01\" (1.829 m)   Wt 67.4 kg (148 lb 8 oz)   SpO2 93%   BMI 20.14 kg/m²       Temp (24hrs), Av.2 °F (36.8 °C), Min:97.7 °F (36.5 °C), Max:98.7 °F (37.1 °C)      Physical Exam:    GENERAL: alert, cooperative, no distress, HEART: regular rate and rhythm, ABDOMEN: soft, non-distended, mild tenderness, incision C/D/I, drain output serous    Data Review: images and reports reviewed    Lab Review: All lab results for the last 24 hours reviewed.     Assessment:     Hospital Problems  Never Reviewed            Codes Class Noted POA    Perforated bowel ([facility] [address] ICD-10-CM: K63.1  ICD-9-CM: 569.83  10/19/2022 Unknown        Perforated ulcer ([facility] [address] ICD-10-CM: K27.5  ICD-9-CM: 533.50  10/19/2022 Unknown           Plan/Recommendations/Medical Decision Making:     Doing well overall  D/C NGT and start clears  Decrease IVF

## 2022-10-22 NOTE — PROGRESS NOTES
Ultrasound IV by Opal Buchanan RN :  Procedure Note    Patient meets criteria for US IV insertion. Ultrasound IV education provided to patient. Opportunities for questions given. Ultrasound used for PIV placement:  20gauge 1.1 cm BD Nexiva  Right Forearm location. 2 X Attempt(s). Flushed with ease; vigorous blood return. Procedure tolerated well. Primary RN aware of IV placement and added to LDA.       Opal Buchanan RN

## 2022-10-22 NOTE — PROGRESS NOTES
Problem: General Medical Care Plan  Goal: *Vital signs within specified parameters  Outcome: Progressing Towards Goal  Goal: *Labs within defined limits  Outcome: Progressing Towards Goal  Goal: *Absence of infection signs and symptoms  Outcome: Progressing Towards Goal  Goal: *Optimal pain control at patient's stated goal  Outcome: Progressing Towards Goal  Goal: *Skin integrity maintained  Outcome: Progressing Towards Goal  Goal: *Fluid volume balance  Outcome: Progressing Towards Goal  Goal: *Optimize nutritional status  Outcome: Progressing Towards Goal  Goal: *Anxiety reduced or absent  Outcome: Progressing Towards Goal  Goal: *Progressive mobility and function (eg: ADL's)  Outcome: Progressing Towards Goal     Problem: Patient Education: Go to Patient Education Activity  Goal: Patient/Family Education  Outcome: Progressing Towards Goal     Problem: Pressure Injury - Risk of  Goal: *Prevention of pressure injury  Description: Document Zia Scale and appropriate interventions in the flowsheet. Outcome: Progressing Towards Goal  Note: Pressure Injury Interventions:  Sensory Interventions: Assess changes in LOC    Moisture Interventions: Absorbent underpads, Assess need for specialty bed, Offer toileting Q_hr    Activity Interventions: Increase time out of bed, PT/OT evaluation    Mobility Interventions: Turn and reposition approx. every two hours(pillow and wedges), Assess need for specialty bed, PT/OT evaluation    Nutrition Interventions: Document food/fluid/supplement intake    Friction and Shear Interventions: Minimize layers, Lift sheet                Problem: Patient Education: Go to Patient Education Activity  Goal: Patient/Family Education  Outcome: Progressing Towards Goal     Problem: Falls - Risk of  Goal: *Absence of Falls  Description: Document Sofia Fothergill Fall Risk and appropriate interventions in the flowsheet.   Outcome: Progressing Towards Goal  Note: Fall Risk Interventions:  Mobility Interventions: Bed/chair exit alarm, Patient to call before getting OOB, Utilize walker, cane, or other assistive device, PT Consult for mobility concerns    Mentation Interventions: Door open when patient unattended    Medication Interventions: Patient to call before getting OOB, Teach patient to arise slowly    Elimination Interventions: Urinal in reach, Bed/chair exit alarm, Call light in reach, Toileting schedule/hourly rounds, Patient to call for help with toileting needs    History of Falls Interventions: Utilize gait belt for transfer/ambulation         Problem: Patient Education: Go to Patient Education Activity  Goal: Patient/Family Education  Outcome: Progressing Towards Goal     Problem: Patient Education: Go to Patient Education Activity  Goal: Patient/Family Education  Outcome: Progressing Towards Goal

## 2022-10-23 LAB
ANION GAP SERPL CALC-SCNC: 5 MMOL/L (ref 5–15)
BASOPHILS # BLD: 0.1 K/UL (ref 0–0.1)
BASOPHILS NFR BLD: 0 % (ref 0–1)
BUN SERPL-MCNC: 15 MG/DL (ref 6–20)
BUN/CREAT SERPL: 15 (ref 12–20)
CALCIUM SERPL-MCNC: 8.6 MG/DL (ref 8.5–10.1)
CHLORIDE SERPL-SCNC: 111 MMOL/L (ref 97–108)
CO2 SERPL-SCNC: 28 MMOL/L (ref 21–32)
CREAT SERPL-MCNC: 0.99 MG/DL (ref 0.7–1.3)
DIFFERENTIAL METHOD BLD: ABNORMAL
EOSINOPHIL # BLD: 0.5 K/UL (ref 0–0.4)
EOSINOPHIL NFR BLD: 4 % (ref 0–7)
ERYTHROCYTE [DISTWIDTH] IN BLOOD BY AUTOMATED COUNT: 13.9 % (ref 11.5–14.5)
GLUCOSE SERPL-MCNC: 125 MG/DL (ref 65–100)
HCT VFR BLD AUTO: 33.7 % (ref 36.6–50.3)
HGB BLD-MCNC: 10.9 G/DL (ref 12.1–17)
IMM GRANULOCYTES # BLD AUTO: 0.1 K/UL (ref 0–0.04)
IMM GRANULOCYTES NFR BLD AUTO: 1 % (ref 0–0.5)
LYMPHOCYTES # BLD: 0.7 K/UL (ref 0.8–3.5)
LYMPHOCYTES NFR BLD: 5 % (ref 12–49)
MCH RBC QN AUTO: 28.1 PG (ref 26–34)
MCHC RBC AUTO-ENTMCNC: 32.3 G/DL (ref 30–36.5)
MCV RBC AUTO: 86.9 FL (ref 80–99)
MONOCYTES # BLD: 0.5 K/UL (ref 0–1)
MONOCYTES NFR BLD: 4 % (ref 5–13)
NEUTS SEG # BLD: 10.7 K/UL (ref 1.8–8)
NEUTS SEG NFR BLD: 85 % (ref 32–75)
NRBC # BLD: 0 K/UL (ref 0–0.01)
NRBC BLD-RTO: 0 PER 100 WBC
PLATELET # BLD AUTO: 345 K/UL (ref 150–400)
PMV BLD AUTO: 11.7 FL (ref 8.9–12.9)
POTASSIUM SERPL-SCNC: 2.9 MMOL/L (ref 3.5–5.1)
RBC # BLD AUTO: 3.88 M/UL (ref 4.1–5.7)
SODIUM SERPL-SCNC: 144 MMOL/L (ref 136–145)
WBC # BLD AUTO: 12.6 K/UL (ref 4.1–11.1)

## 2022-10-23 PROCEDURE — 74011250636 HC RX REV CODE- 250/636: Performed by: NURSE PRACTITIONER

## 2022-10-23 PROCEDURE — C9113 INJ PANTOPRAZOLE SODIUM, VIA: HCPCS | Performed by: SURGERY

## 2022-10-23 PROCEDURE — 80048 BASIC METABOLIC PNL TOTAL CA: CPT

## 2022-10-23 PROCEDURE — 65270000029 HC RM PRIVATE

## 2022-10-23 PROCEDURE — 74011000250 HC RX REV CODE- 250: Performed by: SURGERY

## 2022-10-23 PROCEDURE — 74011250636 HC RX REV CODE- 250/636: Performed by: SURGERY

## 2022-10-23 PROCEDURE — 74011000258 HC RX REV CODE- 258: Performed by: SURGERY

## 2022-10-23 PROCEDURE — 74011250637 HC RX REV CODE- 250/637: Performed by: SURGERY

## 2022-10-23 PROCEDURE — 97535 SELF CARE MNGMENT TRAINING: CPT

## 2022-10-23 PROCEDURE — 36415 COLL VENOUS BLD VENIPUNCTURE: CPT

## 2022-10-23 PROCEDURE — 97165 OT EVAL LOW COMPLEX 30 MIN: CPT

## 2022-10-23 PROCEDURE — 85025 COMPLETE CBC W/AUTO DIFF WBC: CPT

## 2022-10-23 RX ORDER — OXYCODONE AND ACETAMINOPHEN 5; 325 MG/1; MG/1
2 TABLET ORAL
Status: DISCONTINUED | OUTPATIENT
Start: 2022-10-23 | End: 2022-10-26 | Stop reason: HOSPADM

## 2022-10-23 RX ORDER — HYDROMORPHONE HYDROCHLORIDE 1 MG/ML
0.5 INJECTION, SOLUTION INTRAMUSCULAR; INTRAVENOUS; SUBCUTANEOUS
Status: DISCONTINUED | OUTPATIENT
Start: 2022-10-23 | End: 2022-10-26 | Stop reason: HOSPADM

## 2022-10-23 RX ORDER — OXYCODONE AND ACETAMINOPHEN 5; 325 MG/1; MG/1
1 TABLET ORAL
Status: DISCONTINUED | OUTPATIENT
Start: 2022-10-23 | End: 2022-10-26 | Stop reason: HOSPADM

## 2022-10-23 RX ORDER — GABAPENTIN 300 MG/1
300 CAPSULE ORAL 3 TIMES DAILY
Status: DISCONTINUED | OUTPATIENT
Start: 2022-10-23 | End: 2022-10-26 | Stop reason: HOSPADM

## 2022-10-23 RX ORDER — OXYCODONE AND ACETAMINOPHEN 5; 325 MG/1; MG/1
1-2 TABLET ORAL
Status: DISCONTINUED | OUTPATIENT
Start: 2022-10-23 | End: 2022-10-23

## 2022-10-23 RX ORDER — POTASSIUM CHLORIDE 750 MG/1
40 TABLET, FILM COATED, EXTENDED RELEASE ORAL
Status: COMPLETED | OUTPATIENT
Start: 2022-10-23 | End: 2022-10-23

## 2022-10-23 RX ADMIN — HYDRALAZINE HYDROCHLORIDE 20 MG: 20 INJECTION INTRAMUSCULAR; INTRAVENOUS at 04:44

## 2022-10-23 RX ADMIN — HYDRALAZINE HYDROCHLORIDE 20 MG: 20 INJECTION INTRAMUSCULAR; INTRAVENOUS at 12:35

## 2022-10-23 RX ADMIN — ONDANSETRON 4 MG: 2 INJECTION INTRAMUSCULAR; INTRAVENOUS at 08:11

## 2022-10-23 RX ADMIN — SODIUM CHLORIDE 40 MG: 9 INJECTION INTRAMUSCULAR; INTRAVENOUS; SUBCUTANEOUS at 20:16

## 2022-10-23 RX ADMIN — FLUCONAZOLE 400 MG: 2 INJECTION, SOLUTION INTRAVENOUS at 20:16

## 2022-10-23 RX ADMIN — SODIUM CHLORIDE 40 MG: 9 INJECTION INTRAMUSCULAR; INTRAVENOUS; SUBCUTANEOUS at 10:12

## 2022-10-23 RX ADMIN — HYDROMORPHONE HYDROCHLORIDE 1 MG: 1 INJECTION, SOLUTION INTRAMUSCULAR; INTRAVENOUS; SUBCUTANEOUS at 08:08

## 2022-10-23 RX ADMIN — POTASSIUM CHLORIDE 40 MEQ: 750 TABLET, FILM COATED, EXTENDED RELEASE ORAL at 17:44

## 2022-10-23 RX ADMIN — GABAPENTIN 300 MG: 300 CAPSULE ORAL at 22:47

## 2022-10-23 RX ADMIN — HYDROMORPHONE HYDROCHLORIDE 1 MG: 1 INJECTION, SOLUTION INTRAMUSCULAR; INTRAVENOUS; SUBCUTANEOUS at 00:22

## 2022-10-23 RX ADMIN — OXYCODONE AND ACETAMINOPHEN 2 TABLET: 5; 325 TABLET ORAL at 16:35

## 2022-10-23 RX ADMIN — PIPERACILLIN AND TAZOBACTAM 3.38 G: 3; .375 INJECTION, POWDER, LYOPHILIZED, FOR SOLUTION INTRAVENOUS at 03:14

## 2022-10-23 RX ADMIN — PIPERACILLIN AND TAZOBACTAM 3.38 G: 3; .375 INJECTION, POWDER, LYOPHILIZED, FOR SOLUTION INTRAVENOUS at 20:16

## 2022-10-23 RX ADMIN — ENOXAPARIN SODIUM 40 MG: 100 INJECTION SUBCUTANEOUS at 10:12

## 2022-10-23 RX ADMIN — PIPERACILLIN AND TAZOBACTAM 3.38 G: 3; .375 INJECTION, POWDER, LYOPHILIZED, FOR SOLUTION INTRAVENOUS at 12:24

## 2022-10-23 RX ADMIN — GABAPENTIN 300 MG: 300 CAPSULE ORAL at 17:44

## 2022-10-23 RX ADMIN — OXYCODONE AND ACETAMINOPHEN 1 TABLET: 5; 325 TABLET ORAL at 12:35

## 2022-10-23 NOTE — PROGRESS NOTES
Problem: Self Care Deficits Care Plan (Adult)  Goal: *Acute Goals and Plan of Care (Insert Text)  Description: FUNCTIONAL STATUS PRIOR TO ADMISSION: Patient was independent and active without use of DME. Patient was working full time as a  at [employer] and driving prior to admission. HOME SUPPORT: Patient does not have physical support available at discharge. Occupational Therapy Goals  Initiated 10/23/2022  1. Patient will perform bathing with supervision/set-up within 7 day(s). 2.  Patient will perform lower body dressing with independence within 7 day(s). 3.  Patient will perform toilet transfers with DME and supervision/set-up within 7 day(s). 4.  Patient will perform all aspects of toileting with supervision/set-up within 7 day(s). 5.  Patient will participate in upper extremity therapeutic exercise/activities with independence for 10 minutes within 7 day(s). 6. Patient will tolerate all three meals up in recliner to improve tolerance to upright position within 7 days. 7. Patient will tolerate standing at sink for duration of ADL's with supervision within 7 days. Outcome: Progressing Towards Goal     OCCUPATIONAL THERAPY EVALUATION  Patient: Tadeo Marsh (89 y.o. male)  Date: 10/23/2022  Primary Diagnosis: Perforated bowel ([facility] [address] [K63.1]  Perforated ulcer ([facility] [address] [K27.5]  Procedure(s) (LRB):  Peter Patch Repair of Peptic Ulcer (N/A) 4 Days Post-Op   Precautions:  Fall, drain    ASSESSMENT  Based on the objective data described below, the patient presents with decreased standing tolerance, low back pain, decreased BUE strength, decreased leg strength, and overall decreased activity tolerance status post exploratory laparotomy on 10/19 via [place]. Patient initially presented with abdominal pain that progressed to a sharp stabbing pain; upon arrival to hospital, patient was diagnosed with a bowel perforation.  At present, patient is able to ambulate to the bathroom while using RW with min A for balance. Patient is able to reach distal legs in supine using figure 4 position for lower body dressing. Patient requires Min A for standing balance during toileting and for grooming at sink. Ongoing occupational therapy interventions are recommended to address aforementioned deficits, as patient does not have physical assistance available at discharge and has 13 steps to access his main residence. Current Level of Function Impacting Discharge (ADLs/self-care): Min A for toilet transfer. Min A/CGA for standing balance with clothing management. Requires RW. Functional Outcome Measure: The patient scored 60/100 on the Barthel Index outcome measure. Other factors to consider for discharge: full flight of stairs to access residence. Back pain that may limit progress. Patient will benefit from skilled therapy intervention to address the above noted impairments. PLAN :  Recommendations and Planned Interventions: self care training, functional mobility training, therapeutic exercise, balance training, endurance activities, patient education, and home safety training    Frequency/Duration: Patient will be followed by occupational therapy 5 times a week to address goals. Recommendation for discharge: (in order for the patient to meet his/her long term goals)  Therapy 3 hours per day 5-7 days per week    This discharge recommendation:  Has not yet been discussed the attending provider and/or case management    IF patient discharges home will need the following DME: pending progress and discharge plans. SUBJECTIVE:   Patient stated I'm progressing, but I need more gas in my tank to be able to get up and down my stairs.     OBJECTIVE DATA SUMMARY:   HISTORY:   History reviewed. No pertinent past medical history. History reviewed. No pertinent surgical history.     Expanded or extensive additional review of patient history:     Home Situation  Home Environment: Private residence  # Steps to Enter: 3  Rails to Enter: Yes  One/Two Story Residence: Two story  # of Interior Steps: 15  Interior Rails: Left  Living Alone: No  Support Systems: Parent(s) (roommates)  Patient Expects to be Discharged to[de-identified] Home  Current DME Used/Available at Home: Cane, straight, Raised toilet seat  Tub or Shower Type: Tub/Shower combination    Hand dominance: Right    EXAMINATION OF PERFORMANCE DEFICITS:  Cognitive/Behavioral Status:  Neurologic State: Alert; Appropriate for age  Orientation Level: Oriented X4  Cognition: Appropriate for age attention/concentration; Appropriate decision making  Safety/Judgement: Awareness of environment; Fall prevention;Good awareness of safety precautions    Skin: arms and legs intact; abdomen not observed    Edema: none noted    Hearing: Auditory  Auditory Impairment: None    Vision/Perceptual:    Intact; no glasses. Range of Motion:  AROM: Within functional limits  PROM: Within functional limits    Strength:  Strength: Generally decreased, functional    Coordination:  Coordination: Within functional limits  Fine Motor Skills-Upper: Left Intact; Right Intact    Gross Motor Skills-Upper: Left Intact; Right Intact    Tone & Sensation:  Tone: Normal  Sensation: Intact    Balance:  Sitting: Intact; Without support  Standing: Impaired; With support  Standing - Static: Fair;Constant support  Standing - Dynamic : Fair;Constant support    Functional Mobility and Transfers for ADLs:  Bed Mobility:  Rolling: Modified independent  Supine to Sit: Stand-by assistance  Sit to Supine: Stand-by assistance    Transfers:  Sit to Stand: Contact guard assistance  Stand to Sit: Contact guard assistance  Bathroom Mobility: Minimum assistance  Toilet Transfer : Minimum assistance  Assistive Device : Walker    ADL Assessment:  Feeding: Independent    Type of Bath: Partial  Lower Body Dressing: Stand-by assistance    Toileting: Minimum assistance    ADL Intervention and task modifications:  Feeding  Feeding Assistance: Independent    Grooming  Grooming Assistance: Contact guard assistance  Position Performed: Standing  Washing Hands: Contact guard assistance  Cues: Physical assistance (touching A for balance)    Type of Bath: Partial    Lower Body Dressing Assistance  Underpants: Stand-by assistance (figure 4 in supine; SBA for standing)  Socks: Independent  Position Performed: Supine (figure 4 in supine)  Cues: Verbal cues provided    Toileting  Toileting Assistance: Minimum assistance  Bladder Hygiene: Independent  Clothing Management: Moderate assistance  Cues: Verbal cues provided;Physical assistance for pants down;Physical assistance for pants up  Adaptive Equipment: Grab bars; Walker    Cognitive Retraining  Safety/Judgement: Awareness of environment; Fall prevention;Good awareness of safety precautions    Functional Measure:    Barthel Index:  Bathin  Bladder: 10  Bowels: 10  Groomin  Dressin  Feeding: 10  Mobility: 5  Stairs: 0  Toilet Use: 5  Transfer (Bed to Chair and Back): 10  Total: 60/100      The Barthel ADL Index: Guidelines  1. The index should be used as a record of what a patient does, not as a record of what a patient could do. 2. The main aim is to establish degree of independence from any help, physical or verbal, however minor and for whatever reason. 3. The need for supervision renders the patient not independent. 4. A patient's performance should be established using the best available evidence. Asking the patient, friends/relatives and nurses are the usual sources, but direct observation and common sense are also important. However direct testing is not needed. 5. Usually the patient's performance over the preceding 24-48 hours is important, but occasionally longer periods will be relevant. 6. Middle categories imply that the patient supplies over 50 per cent of the effort. 7. Use of aids to be independent is allowed.     Score Interpretation (from [facility])    Independent   60-79 Minimally independent   40-59 Partially dependent   20-39 Very dependent   <20 Totally dependent     -Nader Nunez., Barthel, D.W. (1965). Functional evaluation: the Barthel Index. [address] ([address] (1997). The Barthel activities of daily living index: self-reporting versus actual performance in the old (> or = 75 years). Journal [address] 45(7), 14 [facility], J.J.M.F, Beryl Chavez., Amey Lesch. (1999). Measuring the change in disability after inpatient rehabilitation; comparison of the responsiveness of the Barthel Index and Functional [place]ina Measure. Journal of Neurology, Neurosurgery, and Psychiatry, 66(4), 387-079. TRINI Sarmiento, EDNA Hernandez, & Candy Johnson, M.A. (2004) Assessment of post-stroke quality of life in cost-effectiveness studies: The usefulness of the Barthel Index and the EuroQoL-5D. Quality of Life Research, 15, 503-82        Occupational Therapy Evaluation Charge Determination   History Examination Decision-Making   MEDIUM Complexity : Expanded review of history including physical, cognitive and psychosocial  history  MEDIUM Complexity : 3-5 performance deficits relating to physical, cognitive , or psychosocial skils that result in activity limitations and / or participation restrictions MEDIUM Complexity : Patient may present with comorbidities that affect occupational performnce.  Miniml to moderate modification of tasks or assistance (eg, physical or verbal ) with assesment(s) is necessary to enable patient to complete evaluation       Based on the above components, the patient evaluation is determined to be of the following complexity level: MEDIUM  Pain Ratin/10 back pain    Activity Tolerance:   Fair    After treatment patient left in no apparent distress:    Supine in bed, Call bell within reach, and Bed / chair alarm activated    COMMUNICATION/EDUCATION:   The patients plan of care was discussed with: Physical therapist.     Patient/family agree to work toward stated goals and plan of care. This patients plan of care is appropriate for delegation to JASMYNE.     Thank you for this referral.  Lilia Dennis, OT  Time Calculation: 28 mins

## 2022-10-23 NOTE — PROGRESS NOTES
Progress Note    Patient: Morgan Tapia MRN: [id number]  SSN: [id number]    YOB: 1960  Age: 63 y.o. Sex: male      Admit Date: 10/19/2022    4 Days Post-Op    Procedure:  Procedure(s):  Nelson Pott Patch Repair of Peptic Ulcer    Subjective:     Patient reports moderate muscular soreness today as well as exacerbation of his spinal stenosis pain. Tolerating clears without nausea. Objective:     Visit Vitals  BP (!) 161/91 (BP 1 Location: Left upper arm, BP Patient Position: At rest)   Pulse 98   Temp 97.9 °F (36.6 °C)   Resp 18   Ht 6' 0.01\" (1.829 m)   Wt 67.4 kg (148 lb 8 oz)   SpO2 93%   BMI 20.14 kg/m²       Temp (24hrs), Av °F (36.7 °C), Min:97.8 °F (36.6 °C), Max:98.3 °F (36.8 °C)      Physical Exam:    GENERAL: alert, cooperative, no distress, ABDOMEN: soft, non-distended, mild incisional tenderness, NABS, SKYE output thin serous    Data Review: images and reports reviewed    Lab Review: All lab results for the last 24 hours reviewed. Assessment:     Hospital Problems  Never Reviewed            Codes Class Noted POA    Perforated bowel ([facility] [address] ICD-10-CM: K63.1  ICD-9-CM: 569.83  10/19/2022 Unknown        Perforated ulcer ([facility] [address] ICD-10-CM: K27.5  ICD-9-CM: 533.50  10/19/2022 Unknown           Plan/Recommendations/Medical Decision Making:     Doing well overall  Drain output minimal and serous. Will increase diet  D/C IVF  If tolerates advance of diet, can remove SKYE  Will transition to po pain meds.   Has h/o alcohol abuse so will need to be judicious

## 2022-10-23 NOTE — PROGRESS NOTES
Problem: General Medical Care Plan  Goal: *Vital signs within specified parameters  Outcome: Progressing Towards Goal  Goal: *Labs within defined limits  Outcome: Progressing Towards Goal  Goal: *Absence of infection signs and symptoms  Outcome: Progressing Towards Goal  Goal: *Optimal pain control at patient's stated goal  Outcome: Progressing Towards Goal  Goal: *Skin integrity maintained  Outcome: Progressing Towards Goal  Goal: *Fluid volume balance  Outcome: Progressing Towards Goal  Goal: *Optimize nutritional status  Outcome: Progressing Towards Goal  Goal: *Anxiety reduced or absent  Outcome: Progressing Towards Goal  Goal: *Progressive mobility and function (eg: ADL's)  Outcome: Progressing Towards Goal     Problem: Patient Education: Go to Patient Education Activity  Goal: Patient/Family Education  Outcome: Progressing Towards Goal     Problem: Pressure Injury - Risk of  Goal: *Prevention of pressure injury  Description: Document Zia Scale and appropriate interventions in the flowsheet. Outcome: Progressing Towards Goal  Note: Pressure Injury Interventions:  Sensory Interventions: Assess changes in LOC    Moisture Interventions: Absorbent underpads, Offer toileting Q_hr, Check for incontinence Q2 hours and as needed, Assess need for specialty bed, Apply protective barrier, creams and emollients    Activity Interventions: Increase time out of bed, PT/OT evaluation    Mobility Interventions: PT/OT evaluation, Assess need for specialty bed, Turn and reposition approx.  every two hours(pillow and wedges)    Nutrition Interventions: Document food/fluid/supplement intake    Friction and Shear Interventions: Minimize layers, Lift team/patient mobility team                Problem: Patient Education: Go to Patient Education Activity  Goal: Patient/Family Education  Outcome: Progressing Towards Goal     Problem: Falls - Risk of  Goal: *Absence of Falls  Description: Document Connie Fall Risk and appropriate interventions in the flowsheet.   Outcome: Progressing Towards Goal  Note: Fall Risk Interventions:  Mobility Interventions: Utilize walker, cane, or other assistive device, Utilize gait belt for transfers/ambulation, Patient to call before getting OOB, Bed/chair exit alarm, PT Consult for mobility concerns    Mentation Interventions: Door open when patient unattended    Medication Interventions: Patient to call before getting OOB, Teach patient to arise slowly    Elimination Interventions: Urinal in reach, Bed/chair exit alarm, Call light in reach, Patient to call for help with toileting needs, Toileting schedule/hourly rounds    History of Falls Interventions: Utilize gait belt for transfer/ambulation, Bed/chair exit alarm, Investigate reason for fall, Room close to nurse's station         Problem: Patient Education: Go to Patient Education Activity  Goal: Patient/Family Education  Outcome: Progressing Towards Goal     Problem: Patient Education: Go to Patient Education Activity  Goal: Patient/Family Education  Outcome: Progressing Towards Goal

## 2022-10-24 LAB
ANION GAP SERPL CALC-SCNC: 6 MMOL/L (ref 5–15)
BASOPHILS # BLD: 0.1 K/UL (ref 0–0.1)
BASOPHILS NFR BLD: 1 % (ref 0–1)
BUN SERPL-MCNC: 14 MG/DL (ref 6–20)
BUN/CREAT SERPL: 15 (ref 12–20)
CALCIUM SERPL-MCNC: 8.4 MG/DL (ref 8.5–10.1)
CHLORIDE SERPL-SCNC: 109 MMOL/L (ref 97–108)
CO2 SERPL-SCNC: 27 MMOL/L (ref 21–32)
CREAT SERPL-MCNC: 0.91 MG/DL (ref 0.7–1.3)
DIFFERENTIAL METHOD BLD: ABNORMAL
EOSINOPHIL # BLD: 0.5 K/UL (ref 0–0.4)
EOSINOPHIL NFR BLD: 5 % (ref 0–7)
ERYTHROCYTE [DISTWIDTH] IN BLOOD BY AUTOMATED COUNT: 13.6 % (ref 11.5–14.5)
GLUCOSE SERPL-MCNC: 100 MG/DL (ref 65–100)
HCT VFR BLD AUTO: 32.5 % (ref 36.6–50.3)
HGB BLD-MCNC: 10.4 G/DL (ref 12.1–17)
IMM GRANULOCYTES # BLD AUTO: 0.1 K/UL (ref 0–0.04)
IMM GRANULOCYTES NFR BLD AUTO: 1 % (ref 0–0.5)
LYMPHOCYTES # BLD: 0.8 K/UL (ref 0.8–3.5)
LYMPHOCYTES NFR BLD: 8 % (ref 12–49)
MCH RBC QN AUTO: 27.6 PG (ref 26–34)
MCHC RBC AUTO-ENTMCNC: 32 G/DL (ref 30–36.5)
MCV RBC AUTO: 86.2 FL (ref 80–99)
MONOCYTES # BLD: 0.5 K/UL (ref 0–1)
MONOCYTES NFR BLD: 5 % (ref 5–13)
NEUTS SEG # BLD: 7.9 K/UL (ref 1.8–8)
NEUTS SEG NFR BLD: 80 % (ref 32–75)
NRBC # BLD: 0 K/UL (ref 0–0.01)
NRBC BLD-RTO: 0 PER 100 WBC
PLATELET # BLD AUTO: 350 K/UL (ref 150–400)
PMV BLD AUTO: 11.5 FL (ref 8.9–12.9)
POTASSIUM SERPL-SCNC: 3 MMOL/L (ref 3.5–5.1)
RBC # BLD AUTO: 3.77 M/UL (ref 4.1–5.7)
SODIUM SERPL-SCNC: 142 MMOL/L (ref 136–145)
WBC # BLD AUTO: 9.8 K/UL (ref 4.1–11.1)

## 2022-10-24 PROCEDURE — 74011000258 HC RX REV CODE- 258: Performed by: SURGERY

## 2022-10-24 PROCEDURE — 80048 BASIC METABOLIC PNL TOTAL CA: CPT

## 2022-10-24 PROCEDURE — 36415 COLL VENOUS BLD VENIPUNCTURE: CPT

## 2022-10-24 PROCEDURE — C9113 INJ PANTOPRAZOLE SODIUM, VIA: HCPCS | Performed by: SURGERY

## 2022-10-24 PROCEDURE — 74011250637 HC RX REV CODE- 250/637: Performed by: NURSE PRACTITIONER

## 2022-10-24 PROCEDURE — 74011250636 HC RX REV CODE- 250/636: Performed by: SURGERY

## 2022-10-24 PROCEDURE — 97116 GAIT TRAINING THERAPY: CPT

## 2022-10-24 PROCEDURE — 65270000029 HC RM PRIVATE

## 2022-10-24 PROCEDURE — 74011000250 HC RX REV CODE- 250: Performed by: SURGERY

## 2022-10-24 PROCEDURE — 97530 THERAPEUTIC ACTIVITIES: CPT

## 2022-10-24 PROCEDURE — 85025 COMPLETE CBC W/AUTO DIFF WBC: CPT

## 2022-10-24 PROCEDURE — 74011250637 HC RX REV CODE- 250/637: Performed by: SURGERY

## 2022-10-24 RX ORDER — DOCUSATE SODIUM 100 MG/1
100 CAPSULE, LIQUID FILLED ORAL 2 TIMES DAILY
Status: DISCONTINUED | OUTPATIENT
Start: 2022-10-24 | End: 2022-10-26 | Stop reason: HOSPADM

## 2022-10-24 RX ORDER — AMOXICILLIN 250 MG
1 CAPSULE ORAL DAILY
Status: DISCONTINUED | OUTPATIENT
Start: 2022-10-24 | End: 2022-10-26 | Stop reason: HOSPADM

## 2022-10-24 RX ADMIN — ONDANSETRON 4 MG: 2 INJECTION INTRAMUSCULAR; INTRAVENOUS at 10:56

## 2022-10-24 RX ADMIN — ENOXAPARIN SODIUM 40 MG: 100 INJECTION SUBCUTANEOUS at 10:15

## 2022-10-24 RX ADMIN — SENNOSIDES AND DOCUSATE SODIUM 1 TABLET: 50; 8.6 TABLET ORAL at 12:12

## 2022-10-24 RX ADMIN — GABAPENTIN 300 MG: 300 CAPSULE ORAL at 17:44

## 2022-10-24 RX ADMIN — DOCUSATE SODIUM 100 MG: 100 CAPSULE, LIQUID FILLED ORAL at 12:12

## 2022-10-24 RX ADMIN — GABAPENTIN 300 MG: 300 CAPSULE ORAL at 20:33

## 2022-10-24 RX ADMIN — SODIUM CHLORIDE 40 MG: 9 INJECTION INTRAMUSCULAR; INTRAVENOUS; SUBCUTANEOUS at 10:15

## 2022-10-24 RX ADMIN — POTASSIUM BICARBONATE 50 MEQ: 782 TABLET, EFFERVESCENT ORAL at 10:15

## 2022-10-24 RX ADMIN — SODIUM CHLORIDE 40 MG: 9 INJECTION INTRAMUSCULAR; INTRAVENOUS; SUBCUTANEOUS at 20:33

## 2022-10-24 RX ADMIN — OXYCODONE AND ACETAMINOPHEN 2 TABLET: 5; 325 TABLET ORAL at 06:12

## 2022-10-24 RX ADMIN — HYDROMORPHONE HYDROCHLORIDE 0.5 MG: 1 INJECTION, SOLUTION INTRAMUSCULAR; INTRAVENOUS; SUBCUTANEOUS at 10:55

## 2022-10-24 RX ADMIN — GABAPENTIN 300 MG: 300 CAPSULE ORAL at 10:14

## 2022-10-24 RX ADMIN — OXYCODONE AND ACETAMINOPHEN 1 TABLET: 5; 325 TABLET ORAL at 22:33

## 2022-10-24 RX ADMIN — DOCUSATE SODIUM 100 MG: 100 CAPSULE, LIQUID FILLED ORAL at 17:44

## 2022-10-24 RX ADMIN — PIPERACILLIN AND TAZOBACTAM 3.38 G: 3; .375 INJECTION, POWDER, LYOPHILIZED, FOR SOLUTION INTRAVENOUS at 03:07

## 2022-10-24 RX ADMIN — OXYCODONE AND ACETAMINOPHEN 2 TABLET: 5; 325 TABLET ORAL at 15:17

## 2022-10-24 NOTE — PROGRESS NOTES
General Surgery    Patient is a 63year-old man s/p open Peter patch for perforated duodenal ulcer, POD#5. Patient states he is doing well. Yan solid food diet this AM; denies nausea/vomiting/burping/hiccupping/bloating.  (+) flatus/(-) BM. Working w/ PT but has a dozen stairs to the second floor and there is no option to stay on the 1st floor until complete recovery. No family currently at bedside. Patient Vitals for the past 24 hrs:   Temp Pulse Resp BP SpO2   10/24/22 1128 97.6 °F (36.4 °C) 81 16 138/71 94 %   10/24/22 0948 -- 84 -- (!) 192/87 --   10/24/22 0940 -- -- -- (!) 170/93 --   10/24/22 0939 -- 82 -- (!) 167/84 --   10/24/22 0758 98 °F (36.7 °C) 84 16 (!) 148/78 93 %   10/24/22 0411 98.4 °F (36.9 °C) 80 14 139/74 93 %   10/24/22 0039 98.2 °F (36.8 °C) 72 16 (!) 147/77 93 %   10/23/22 2013 98.5 °F (36.9 °C) 83 16 138/76 93 %   10/23/22 1629 98.5 °F (36.9 °C) 94 16 139/78 94 %   10/23/22 1214 97.4 °F (36.3 °C) 85 16 (!) 160/84 93 %     Date 10/23/22 0700 - 10/24/22 0659 10/24/22 0700 - 10/25/22 0659   Shift 6746-5906 5420-7860 24 Hour Total 6319-4466 6081-1874 24 Hour Total   INTAKE   P.O. 444  444 380  380     P. O. 444  444 380  380   I. V.(mL/kg/hr) 390(0.5) 400(0.5) 790(0.5)        Volume (lactated Ringers infusion) 390  390        Volume (fluconazole (DIFLUCAN) 400mg/200 mL IVPB (premix))  200 200        Volume (piperacillin-tazobactam (ZOSYN) 3.375 g in 0.9% sodium chloride (MBP/ADV) 100 mL MBP)  200 200      Shift Total(mL/kg) 834(12.4) 400(5.9) 1234(18.3) 380(5.6)  380(5.6)   OUTPUT   Urine(mL/kg/hr) 230(0.3) 200(0.2) 430(0.3)        Urine Voided 230 200 430        Urine Occurrence(s) 1 x  1 x 1 x  1 x   Drains 100 100 200 80  80     Output (ml) (Bruce-Marrufo Drain 10/19/22 Left;Upper Abdomen) 100 100 200 80  80   Stool           Stool Occurrence(s)    0 x  0 x   Shift Total(mL/kg) 330(4.9) 300(4.5) 630(9.4) 80(1.2)  80(1.2)    100 604 300  300   Weight (kg) 67.4 67.4 67.4 67.4 67.4 67.4     Gen- no distress  Abd- soft; appropriately tender; mildly distended; incision clean/dry/intact w/o erythema/drainage/ecchymosis; drain w/ serous output      Recent Results (from the past 24 hour(s))   CBC WITH AUTOMATED DIFF    Collection Time: 10/24/22  4:01 AM   Result Value Ref Range    WBC 9.8 4.1 - 11.1 K/uL    RBC 3.77 (L) 4.10 - 5.70 M/uL    HGB 10.4 (L) 12.1 - 17.0 g/dL    HCT 32.5 (L) 36.6 - 50.3 %    MCV 86.2 80.0 - 99.0 FL    MCH 27.6 26.0 - 34.0 PG    MCHC 32.0 30.0 - 36.5 g/dL    RDW 13.6 11.5 - 14.5 %    PLATELET 880 271 - 095 K/uL    MPV 11.5 8.9 - 12.9 FL    NRBC 0.0 0  WBC    ABSOLUTE NRBC 0.00 0.00 - 0.01 K/uL    NEUTROPHILS 80 (H) 32 - 75 %    LYMPHOCYTES 8 (L) 12 - 49 %    MONOCYTES 5 5 - 13 %    EOSINOPHILS 5 0 - 7 %    BASOPHILS 1 0 - 1 %    IMMATURE GRANULOCYTES 1 (H) 0.0 - 0.5 %    ABS. NEUTROPHILS 7.9 1.8 - 8.0 K/UL    ABS. LYMPHOCYTES 0.8 0.8 - 3.5 K/UL    ABS. MONOCYTES 0.5 0.0 - 1.0 K/UL    ABS. EOSINOPHILS 0.5 (H) 0.0 - 0.4 K/UL    ABS. BASOPHILS 0.1 0.0 - 0.1 K/UL    ABS. IMM. GRANS. 0.1 (H) 0.00 - 0.04 K/UL    DF AUTOMATED     METABOLIC PANEL, BASIC    Collection Time: 10/24/22  4:01 AM   Result Value Ref Range    Sodium 142 136 - 145 mmol/L    Potassium 3.0 (L) 3.5 - 5.1 mmol/L    Chloride 109 (H) 97 - 108 mmol/L    CO2 27 21 - 32 mmol/L    Anion gap 6 5 - 15 mmol/L    Glucose 100 65 - 100 mg/dL    BUN 14 6 - 20 MG/DL    Creatinine 0.91 0.70 - 1.30 MG/DL    BUN/Creatinine ratio 15 12 - 20      eGFR >60 >60 ml/min/1.73m2    Calcium 8.4 (L) 8.5 - 10.1 MG/DL       A/P: Patient is a 63year-old man s/p open Peter patch for perforated duodenal ulcer, POD#5, improving.  -- Cont GI bland diet. -- Colace/senna. -- d/c drain tomorrow.  -- d/c antibiotics: pt received 4 days post-op following source control. -- Ambulate. -- PT notes pt borderline for discharge home vs SNF secondary to stairs. Case Mgmt for placement. Severo Porch.  Pallavi Gallagher MD, FACS

## 2022-10-24 NOTE — PROGRESS NOTES
Problem: General Medical Care Plan  Goal: *Vital signs within specified parameters  Outcome: Progressing Towards Goal  Goal: *Labs within defined limits  Outcome: Progressing Towards Goal  Goal: *Absence of infection signs and symptoms  Outcome: Progressing Towards Goal  Goal: *Optimal pain control at patient's stated goal  Outcome: Progressing Towards Goal  Goal: *Skin integrity maintained  Outcome: Progressing Towards Goal  Goal: *Fluid volume balance  Outcome: Progressing Towards Goal  Goal: *Optimize nutritional status  Outcome: Progressing Towards Goal  Goal: *Anxiety reduced or absent  Outcome: Progressing Towards Goal  Goal: *Progressive mobility and function (eg: ADL's)  Outcome: Progressing Towards Goal     Problem: Patient Education: Go to Patient Education Activity  Goal: Patient/Family Education  Outcome: Progressing Towards Goal     Problem: Pressure Injury - Risk of  Goal: *Prevention of pressure injury  Description: Document Zia Scale and appropriate interventions in the flowsheet.   Outcome: Progressing Towards Goal  Note: Pressure Injury Interventions:  Sensory Interventions: Assess need for specialty bed, Check visual cues for pain, Discuss PT/OT consult with provider, Float heels, Keep linens dry and wrinkle-free    Moisture Interventions: Absorbent underpads, Contain wound drainage    Activity Interventions: Increase time out of bed, PT/OT evaluation, Pressure redistribution bed/mattress(bed type)    Mobility Interventions: PT/OT evaluation, Pressure redistribution bed/mattress (bed type), HOB 30 degrees or less    Nutrition Interventions: Document food/fluid/supplement intake    Friction and Shear Interventions: Feet elevated on foot rest, HOB 30 degrees or less                Problem: Patient Education: Go to Patient Education Activity  Goal: Patient/Family Education  Outcome: Progressing Towards Goal     Problem: Falls - Risk of  Goal: *Absence of Falls  Description: Document [email or web address] Risk and appropriate interventions in the flowsheet.   Outcome: Progressing Towards Goal  Note: Fall Risk Interventions:  Mobility Interventions: Patient to call before getting OOB, PT Consult for mobility concerns    Mentation Interventions: Adequate sleep, hydration, pain control, Bed/chair exit alarm, More frequent rounding    Medication Interventions: Evaluate medications/consider consulting pharmacy, Patient to call before getting OOB    Elimination Interventions: Patient to call for help with toileting needs, Bed/chair exit alarm, Call light in reach    History of Falls Interventions: Bed/chair exit alarm, Consult care management for discharge planning, Door open when patient unattended         Problem: Patient Education: Go to Patient Education Activity  Goal: Patient/Family Education  Outcome: Progressing Towards Goal     Problem: Patient Education: Go to Patient Education Activity  Goal: Patient/Family Education  Outcome: Progressing Towards Goal     Problem: Patient Education: Go to Patient Education Activity  Goal: Patient/Family Education  Outcome: Progressing Towards Goal

## 2022-10-24 NOTE — PROGRESS NOTES
Problem: Mobility Impaired (Adult and Pediatric)  Goal: *Acute Goals and Plan of Care (Insert Text)  Description: FUNCTIONAL STATUS PRIOR TO ADMISSION: Patient was independent and active without use of DME.    HOME SUPPORT PRIOR TO ADMISSION: The patient lived with roommates but did not require assist. His parents live locally. Physical Therapy Goals  Initiated 10/21/2022  1. Patient will move from supine to sit and sit to supine , scoot up and down, and roll side to side in bed with independence within 7 day(s). 2.  Patient will transfer from bed to chair and chair to bed with modified independence using the least restrictive device within 7 day(s). 3.  Patient will perform sit to stand with modified independence within 7 day(s). 4.  Patient will ambulate with modified independence for 75 feet with the least restrictive device within 7 day(s). 5.  Patient will ascend/descend 6 stairs with 1 handrail(s) with minimal assistance/contact guard assist within 7 day(s). Outcome: Progressing Towards Goal   PHYSICAL THERAPY TREATMENT  Patient: Garrett Rogers (16 y.o. male)  Date: 10/24/2022  Diagnosis: Perforated bowel ([facility] [K63.1]  Perforated ulcer ([facility] [address] [K27.5] <principal problem not specified>  Procedure(s) (LRB):  Peter Patch Repair of Peptic Ulcer (N/A) 5 Days Post-Op  Precautions: Fall (NGT; drain)  Chart, physical therapy assessment, plan of care and goals were reviewed. ASSESSMENT  Patient continues with skilled PT services and is progressing towards goals. Patient this afternoon with good improvement and progress towards goals but continues with LE weakness and fast fatigue. Patient motivated to work with therapy and realistic about his current functional mobility status. Patient today tolerates increased 80ft ambulation, however snf through has increased roni LE fatigue with knee softening. Up to [place]ida for safe ambulation.  Patient making great gains with therapy but continues with fast fatigue and weakness hindering safe d/c home. Continue to recommend SNF level rehab based on today's presentation, however with continued good progress HH may be possible. Patient would need to stair train prior to d/c and notes that this was an issue even prior to admission. Current Level of Function Impacting Discharge (mobility/balance): Maida ambulation with RW + knee instability and LE weakness     Other factors to consider for discharge: patient making good progress towards therapy goals. He lives on second floor of his home and reports that there are not options for moving to the first floor upon d/c       PLAN :  Patient continues to benefit from skilled intervention to address the above impairments. Continue treatment per established plan of care. to address goals. Recommendation for discharge: (in order for the patient to meet his/her long term goals)  Therapy up to 5 days/week in SNF setting    Pending continued good progress HH may be possible    This discharge recommendation:  A follow-up discussion with the attending provider and/or case management is planned    IF patient discharges home will need the following DME: rolling walker       SUBJECTIVE:   Patient stated I understand.  re: discussing PT plan of care with patient     OBJECTIVE DATA SUMMARY:   Patient received supine in bed and was agreeable to participate in PT session. Patient was cleared by nursing to participate in PT session. Critical Behavior:  Neurologic State: Alert  Orientation Level: Oriented X4  Cognition: Follows commands, Appropriate decision making, Appropriate safety awareness  Safety/Judgement: Awareness of environment, Fall prevention, Good awareness of safety precautions  Functional Mobility Training:  Bed Mobility:     Supine to Sit: Stand-by assistance; Additional time     Scooting: Stand-by assistance        Transfers:  Sit to Stand: Contact guard assistance  Stand to Sit: Contact guard assistance        Bed to Chair: Contact guard assistance                    Balance:  Sitting: Intact; Without support  Standing: Impaired; With support  Standing - Static: Good  Standing - Dynamic : Fair;Constant support  Ambulation/Gait Training:  Distance (ft): 80 Feet (ft)  Assistive Device: Gait belt;Walker, rolling  Ambulation - Level of Assistance: Minimal assistance;Contact guard assistance;Assist x1        Gait Abnormalities: Decreased step clearance (increased truncal flexion and fast fatigue with knee softening)        Base of Support: Narrowed     Speed/Sue: Pace decreased (<100 feet/min); Shuffled                   Therapeutic Exercises:     Pain Rating:  Patient with minimal reports of abdominal pain during session    Activity Tolerance:   Fair and requires rest breaks    After treatment patient left in no apparent distress:   Sitting in chair, Call bell within reach, and Bed / chair alarm activated    COMMUNICATION/COLLABORATION:   The patients plan of care was discussed with: Occupational therapist, Registered nurse, and Physician.      [facility] PT, DPT   Time Calculation: 28 mins

## 2022-10-24 NOTE — PROGRESS NOTES
10/24/2022  5:23 PM  Care Management Progress Note      ICD-10-CM ICD-9-CM    1. Small bowel perforation (HCC)  K63.1 569.83       2. Abdominal pain, generalized  R10.84 789.07           RUR:  8%  Risk Level: [x]Low []Moderate []High  Value-based purchasing: [] Yes [x] No  Bundle patient: [] Yes [x] No   Specify:     Transition of care plan:  Awaiting medical clearance and DC order. PT/OT treating. TBD pending progress with therapy - CM consult for IPR noted. PT's note indicated IPR vs HH pending progress. Pt walked 80ft at contact guard assist per PT's note; therefore, [facility] is likely not to be approved by IPR. Pt's insurance will not cover SNF. Outpatient follow-up. Transport need TBD.

## 2022-10-24 NOTE — PROGRESS NOTES
Problem: General Medical Care Plan  Goal: *Vital signs within specified parameters  Outcome: Progressing Towards Goal  Goal: *Labs within defined limits  Outcome: Progressing Towards Goal  Goal: *Absence of infection signs and symptoms  Outcome: Progressing Towards Goal  Goal: *Optimal pain control at patient's stated goal  Outcome: Progressing Towards Goal  Goal: *Skin integrity maintained  Outcome: Progressing Towards Goal  Goal: *Fluid volume balance  Outcome: Progressing Towards Goal  Goal: *Optimize nutritional status  Outcome: Progressing Towards Goal  Goal: *Anxiety reduced or absent  Outcome: Progressing Towards Goal  Goal: *Progressive mobility and function (eg: ADL's)  Outcome: Progressing Towards Goal     Problem: Patient Education: Go to Patient Education Activity  Goal: Patient/Family Education  Outcome: Progressing Towards Goal     Problem: Pressure Injury - Risk of  Goal: *Prevention of pressure injury  Description: Document Zia Scale and appropriate interventions in the flowsheet. Outcome: Progressing Towards Goal  Note: Pressure Injury Interventions:  Sensory Interventions: Assess changes in LOC    Moisture Interventions: Absorbent underpads, Offer toileting Q_hr, Check for incontinence Q2 hours and as needed, Assess need for specialty bed, Apply protective barrier, creams and emollients    Activity Interventions: Increase time out of bed, PT/OT evaluation    Mobility Interventions: PT/OT evaluation, Assess need for specialty bed, Turn and reposition approx.  every two hours(pillow and wedges)    Nutrition Interventions: Document food/fluid/supplement intake    Friction and Shear Interventions: Minimize layers, Lift team/patient mobility team                Problem: Patient Education: Go to Patient Education Activity  Goal: Patient/Family Education  Outcome: Progressing Towards Goal     Problem: Falls - Risk of  Goal: *Absence of Falls  Description: Document Connie Fall Risk and appropriate interventions in the flowsheet.   Outcome: Progressing Towards Goal  Note: Fall Risk Interventions:  Mobility Interventions: Utilize walker, cane, or other assistive device, Utilize gait belt for transfers/ambulation, Patient to call before getting OOB, Bed/chair exit alarm, PT Consult for mobility concerns    Mentation Interventions: Door open when patient unattended    Medication Interventions: Patient to call before getting OOB, Teach patient to arise slowly    Elimination Interventions: Urinal in reach, Bed/chair exit alarm, Call light in reach, Patient to call for help with toileting needs, Toileting schedule/hourly rounds    History of Falls Interventions: Utilize gait belt for transfer/ambulation, Bed/chair exit alarm, Investigate reason for fall, Room close to nurse's station         Problem: Patient Education: Go to Patient Education Activity  Goal: Patient/Family Education  Outcome: Progressing Towards Goal     Problem: Patient Education: Go to Patient Education Activity  Goal: Patient/Family Education  Outcome: Progressing Towards Goal     Problem: Patient Education: Go to Patient Education Activity  Goal: Patient/Family Education  Outcome: Progressing Towards Goal

## 2022-10-25 LAB
ANION GAP SERPL CALC-SCNC: 5 MMOL/L (ref 5–15)
BUN SERPL-MCNC: 16 MG/DL (ref 6–20)
BUN/CREAT SERPL: 16 (ref 12–20)
CALCIUM SERPL-MCNC: 8.8 MG/DL (ref 8.5–10.1)
CHLORIDE SERPL-SCNC: 108 MMOL/L (ref 97–108)
CO2 SERPL-SCNC: 29 MMOL/L (ref 21–32)
CREAT SERPL-MCNC: 0.97 MG/DL (ref 0.7–1.3)
GLUCOSE SERPL-MCNC: 123 MG/DL (ref 65–100)
POTASSIUM SERPL-SCNC: 3.4 MMOL/L (ref 3.5–5.1)
SODIUM SERPL-SCNC: 142 MMOL/L (ref 136–145)

## 2022-10-25 PROCEDURE — 36415 COLL VENOUS BLD VENIPUNCTURE: CPT

## 2022-10-25 PROCEDURE — 74011250636 HC RX REV CODE- 250/636: Performed by: NURSE PRACTITIONER

## 2022-10-25 PROCEDURE — 80048 BASIC METABOLIC PNL TOTAL CA: CPT

## 2022-10-25 PROCEDURE — C9113 INJ PANTOPRAZOLE SODIUM, VIA: HCPCS | Performed by: SURGERY

## 2022-10-25 PROCEDURE — 74011250637 HC RX REV CODE- 250/637: Performed by: NURSE PRACTITIONER

## 2022-10-25 PROCEDURE — 74011250637 HC RX REV CODE- 250/637: Performed by: SURGERY

## 2022-10-25 PROCEDURE — 65270000029 HC RM PRIVATE

## 2022-10-25 PROCEDURE — 74011250636 HC RX REV CODE- 250/636: Performed by: SURGERY

## 2022-10-25 PROCEDURE — 74011000250 HC RX REV CODE- 250: Performed by: SURGERY

## 2022-10-25 PROCEDURE — 97116 GAIT TRAINING THERAPY: CPT

## 2022-10-25 RX ADMIN — OXYCODONE AND ACETAMINOPHEN 2 TABLET: 5; 325 TABLET ORAL at 17:52

## 2022-10-25 RX ADMIN — HYDRALAZINE HYDROCHLORIDE 20 MG: 20 INJECTION INTRAMUSCULAR; INTRAVENOUS at 21:35

## 2022-10-25 RX ADMIN — GABAPENTIN 300 MG: 300 CAPSULE ORAL at 17:48

## 2022-10-25 RX ADMIN — DOCUSATE SODIUM 100 MG: 100 CAPSULE, LIQUID FILLED ORAL at 17:48

## 2022-10-25 RX ADMIN — OXYCODONE AND ACETAMINOPHEN 1 TABLET: 5; 325 TABLET ORAL at 06:52

## 2022-10-25 RX ADMIN — DOCUSATE SODIUM 100 MG: 100 CAPSULE, LIQUID FILLED ORAL at 10:05

## 2022-10-25 RX ADMIN — SODIUM CHLORIDE 40 MG: 9 INJECTION INTRAMUSCULAR; INTRAVENOUS; SUBCUTANEOUS at 21:39

## 2022-10-25 RX ADMIN — GABAPENTIN 300 MG: 300 CAPSULE ORAL at 21:40

## 2022-10-25 RX ADMIN — OXYCODONE AND ACETAMINOPHEN 2 TABLET: 5; 325 TABLET ORAL at 12:51

## 2022-10-25 RX ADMIN — GABAPENTIN 300 MG: 300 CAPSULE ORAL at 10:06

## 2022-10-25 RX ADMIN — SODIUM CHLORIDE 40 MG: 9 INJECTION INTRAMUSCULAR; INTRAVENOUS; SUBCUTANEOUS at 10:05

## 2022-10-25 RX ADMIN — ENOXAPARIN SODIUM 40 MG: 100 INJECTION SUBCUTANEOUS at 10:06

## 2022-10-25 RX ADMIN — SENNOSIDES AND DOCUSATE SODIUM 1 TABLET: 50; 8.6 TABLET ORAL at 10:05

## 2022-10-25 NOTE — PROGRESS NOTES
Bedside shift change report given to [facility] RN (oncoming nurse) by Trudy Nicolas RN (offgoing nurse). Report included the following information SBAR, Kardex, Intake/Output, MAR, and Recent Results.

## 2022-10-25 NOTE — PROGRESS NOTES
Problem: General Medical Care Plan  Goal: *Vital signs within specified parameters  Outcome: Progressing Towards Goal  Goal: *Labs within defined limits  Outcome: Progressing Towards Goal  Goal: *Absence of infection signs and symptoms  Outcome: Progressing Towards Goal  Goal: *Optimal pain control at patient's stated goal  Outcome: Progressing Towards Goal  Goal: *Skin integrity maintained  Outcome: Progressing Towards Goal  Goal: *Fluid volume balance  Outcome: Progressing Towards Goal  Goal: *Optimize nutritional status  Outcome: Progressing Towards Goal  Goal: *Anxiety reduced or absent  Outcome: Progressing Towards Goal  Goal: *Progressive mobility and function (eg: ADL's)  Outcome: Progressing Towards Goal     Problem: Patient Education: Go to Patient Education Activity  Goal: Patient/Family Education  Outcome: Progressing Towards Goal     Problem: Pressure Injury - Risk of  Goal: *Prevention of pressure injury  Description: Document Zia Scale and appropriate interventions in the flowsheet. Outcome: Progressing Towards Goal  Note: Pressure Injury Interventions:  Sensory Interventions: Assess changes in LOC, Keep linens dry and wrinkle-free, Maintain/enhance activity level    Moisture Interventions: Absorbent underpads    Activity Interventions: Assess need for specialty bed, Increase time out of bed    Mobility Interventions: Assess need for specialty bed, HOB 30 degrees or less, PT/OT evaluation    Nutrition Interventions: Document food/fluid/supplement intake, Offer support with meals,snacks and hydration    Friction and Shear Interventions: HOB 30 degrees or less, Lift sheet, Minimize layers                Problem: Patient Education: Go to Patient Education Activity  Goal: Patient/Family Education  Outcome: Progressing Towards Goal     Problem: Falls - Risk of  Goal: *Absence of Falls  Description: Document Connie Fall Risk and appropriate interventions in the flowsheet.   Outcome: Progressing Towards Goal     Problem: Patient Education: Go to Patient Education Activity  Goal: Patient/Family Education  Outcome: Progressing Towards Goal     Problem: Patient Education: Go to Patient Education Activity  Goal: Patient/Family Education  Outcome: Progressing Towards Goal     Problem: Patient Education: Go to Patient Education Activity  Goal: Patient/Family Education  Outcome: Progressing Towards Goal

## 2022-10-25 NOTE — PROGRESS NOTES
Bedside shift change report given to VELMA Rapp (oncoming nurse) by Gurinder Patten RN (offgoing nurse). Report included the following information SBAR, Kardex, Intake/Output, MAR, and Recent Results.

## 2022-10-25 NOTE — PROGRESS NOTES
Problem: Mobility Impaired (Adult and Pediatric)  Goal: *Acute Goals and Plan of Care (Insert Text)  Description: FUNCTIONAL STATUS PRIOR TO ADMISSION: Patient was independent and active without use of DME.    HOME SUPPORT PRIOR TO ADMISSION: The patient lived with roommates but did not require assist. His parents live locally. Physical Therapy Goals  Initiated 10/21/2022  1. Patient will move from supine to sit and sit to supine , scoot up and down, and roll side to side in bed with independence within 7 day(s). 2.  Patient will transfer from bed to chair and chair to bed with modified independence using the least restrictive device within 7 day(s). 3.  Patient will perform sit to stand with modified independence within 7 day(s). 4.  Patient will ambulate with modified independence for 75 feet with the least restrictive device within 7 day(s). 5.  Patient will ascend/descend 6 stairs with 1 handrail(s) with minimal assistance/contact guard assist within 7 day(s). Outcome: Progressing Towards Goal   PHYSICAL THERAPY TREATMENT  Patient: Juliano Oliva (30 y.o. male)  Date: 10/25/2022  Diagnosis: Perforated bowel ([facility] [address] [K63.1]  Perforated ulcer ([facility] [address] [K27.5] <principal problem not specified>  Procedure(s) (LRB):  Peter Patch Repair of Peptic Ulcer (N/A) 6 Days Post-Op  Precautions: Fall (NGT; drain)  Chart, physical therapy assessment, plan of care and goals were reviewed. ASSESSMENT  Patient continues with skilled PT services and is progressing towards goals. Patient this morning with good participation and motivation to progress with physical therapy. Patient tolerates 8 step stair navigation with CGA and lateral ascent with L handrail. Patient has 13 steps to ascend to second story bedroom at home. PT encouraged having a chair at the top of the stairs ready for seated rest break.  Patient demonstrating fatigue and diaphoresis after stair training, but denies lightheadedness and dizziness and vitals stable as below. He tolerates 50ft ambulation with SBA and RW. With fatigue, patient with increased forward flexed posture secondary to his history of spinal stenosis. Patient reporting chronic spinal stenosis as his main limiter to mobility today. At this time patient is progressing well with PT. Recommend HHPT follow up with initial support with stair navigation upon d/c. Current Level of Function Impacting Discharge (mobility/balance): CGA stair navigation; SBA with RW; fast fatigue    Other factors to consider for discharge: patient tells PT this morning that his father offered for him to come home to his house which is one story with 3 steps to enter. PT highly encouraging this option upon d/c for increased support. PLAN :  Patient continues to benefit from skilled intervention to address the above impairments. Continue treatment per established plan of care. to address goals. Recommendation for discharge: (in order for the patient to meet his/her long term goals)  Physical therapy at least 2 days/week in the home AND ensure assist and/or supervision for safety with stair navigation    This discharge recommendation:  Has been made in collaboration with the attending provider and/or case management    IF patient discharges home will need the following DME: rolling walker       SUBJECTIVE:   Patient stated you're the commander.     OBJECTIVE DATA SUMMARY:   Patient received seated in bedside chair, agreeable to participate in PT session. Patient was cleared by nursing to participate in PT session.      Vitals:    10/25/22 1145 10/25/22 1148 10/25/22 1200   BP: 133/88 (!) 146/84 (!) 145/94   BP 1 Location: Left upper arm     BP Patient Position: Sitting Standing Sitting  Comment: following stair navigation   Pulse: 88  86   Temp:      Resp:      Height:      Weight:      SpO2:              Critical Behavior:  Neurologic State: Alert  Orientation Level: Oriented X4  Cognition: Appropriate decision making, Appropriate for age attention/concentration, Appropriate safety awareness, Follows commands  Safety/Judgement: Awareness of environment, Fall prevention, Good awareness of safety precautions  Functional Mobility Training:  Bed Mobility:           Scooting: Supervision        Transfers:  Sit to Stand: Stand-by assistance  Stand to Sit: Stand-by assistance                             Balance:  Sitting: Intact; Without support  Standing: Impaired; With support  Standing - Static: Good  Standing - Dynamic : Fair  Ambulation/Gait Training:  Distance (ft): 50 Feet (ft)  Assistive Device: Gait belt;Walker, rolling  Ambulation - Level of Assistance: Contact guard assistance        Gait Abnormalities: Step to gait; Decreased step clearance (increased trunk flexion)        Base of Support: Narrowed     Speed/Sue: Pace decreased (<100 feet/min)                       Stairs:  Number of Stairs Trained: 8  Stairs - Level of Assistance: Contact guard assistance; Additional time   Rail Use: Left  (lateral stair ascent)    Therapeutic Exercises:     Pain Rating:  Patient complains of abdominal pain from drain being pulled immediately prior to PT as well as chronic history of spinal stenosis    Activity Tolerance:   Fair and requires rest breaks    After treatment patient left in no apparent distress:   Sitting in chair and Call bell within reach    COMMUNICATION/COLLABORATION:   The patients plan of care was discussed with: Registered nurse, Physician, and Case management.      Natasha Villarreal PT, DPT   Time Calculation: 29 mins

## 2022-10-25 NOTE — PROGRESS NOTES
Problem: General Medical Care Plan  Goal: *Vital signs within specified parameters  Outcome: Progressing Towards Goal  Goal: *Labs within defined limits  Outcome: Progressing Towards Goal  Goal: *Absence of infection signs and symptoms  Outcome: Progressing Towards Goal  Goal: *Optimal pain control at patient's stated goal  Outcome: Progressing Towards Goal  Goal: *Skin integrity maintained  Outcome: Progressing Towards Goal  Goal: *Fluid volume balance  Outcome: Progressing Towards Goal  Goal: *Optimize nutritional status  Outcome: Progressing Towards Goal  Goal: *Anxiety reduced or absent  Outcome: Progressing Towards Goal  Goal: *Progressive mobility and function (eg: ADL's)  Outcome: Progressing Towards Goal     Problem: Patient Education: Go to Patient Education Activity  Goal: Patient/Family Education  Outcome: Progressing Towards Goal     Problem: Pressure Injury - Risk of  Goal: *Prevention of pressure injury  Description: Document Zia Scale and appropriate interventions in the flowsheet. Outcome: Progressing Towards Goal  Note: Pressure Injury Interventions:  Sensory Interventions: Assess need for specialty bed, Assess changes in LOC, Check visual cues for pain, Discuss PT/OT consult with provider, Keep linens dry and wrinkle-free, Maintain/enhance activity level, Minimize linen layers    Moisture Interventions: Absorbent underpads    Activity Interventions: Increase time out of bed, PT/OT evaluation    Mobility Interventions: Suspension boots, Turn and reposition approx.  every two hours(pillow and wedges)    Nutrition Interventions: Document food/fluid/supplement intake    Friction and Shear Interventions: Feet elevated on foot rest                Problem: Patient Education: Go to Patient Education Activity  Goal: Patient/Family Education  Outcome: Progressing Towards Goal     Problem: Falls - Risk of  Goal: *Absence of Falls  Description: Document Connie Fall Risk and appropriate interventions in the flowsheet.   Outcome: Progressing Towards Goal  Note: Fall Risk Interventions:  Mobility Interventions: Bed/chair exit alarm, Patient to call before getting OOB    Mentation Interventions: Adequate sleep, hydration, pain control    Medication Interventions: Evaluate medications/consider consulting pharmacy, Bed/chair exit alarm    Elimination Interventions: Patient to call for help with toileting needs, Bed/chair exit alarm, Call light in reach    History of Falls Interventions: Bed/chair exit alarm         Problem: Patient Education: Go to Patient Education Activity  Goal: Patient/Family Education  Outcome: Progressing Towards Goal     Problem: Patient Education: Go to Patient Education Activity  Goal: Patient/Family Education  Outcome: Progressing Towards Goal     Problem: Patient Education: Go to Patient Education Activity  Goal: Patient/Family Education  Outcome: Progressing Towards Goal

## 2022-10-25 NOTE — PROGRESS NOTES
Comprehensive Nutrition Assessment    Type and Reason for Visit: Reassess    Nutrition Recommendations/Plan:   Continue NPO per Surgery - advance as able to Low Fiber. Add Ensure Clear and advance to Ensure Enlive TID as able - any/all flavors  Monitor GI status     Malnutrition Assessment:  Malnutrition Status: Moderate malnutrition (10/25/22 1309)    Context:  Acute illness     Findings of the 6 clinical characteristics of malnutrition:   Energy Intake:  75% or less of est energy req for 7 or more days  Weight Loss:  Greater than 7.5% over 3 months     Body Fat Loss:  Mild body fat loss, Fat overlying ribs, Buccal region   Muscle Mass Loss:  Mild muscle mass loss, Clavicles (pectoralis & deltoids), Scapula (trapezius), Calf  Fluid Accumulation:  No significant fluid accumulation,     Strength:  Not performed     Nutrition Assessment:       Pt admitted with Perforated bowel ([facility] [address] [K63.1] and Perforated ulcer ([facility] [address] [K27.5]. History reviewed. No pertinent past medical history. 10/20: Pt POD 1 gastric ulcer repair. Pt NPO until likely Saturday or Sunday to monitor for any additional bleeding ulcers. Pt agreeable to Ensure any/all flavors as able for added protein and kcal. Pt reports -170 lbs. MST received for unsure wt loss. Bedscale with SCD pump removed was 148.5 lbs on visit. Pt states he was eating less over the course of the last 4-6 weeks due to stomach pain. Pt was eating ~50% of normal PO intake for this time frame. No known food allergies. No chew/swallow difficulties. Last BM 10/17 PTA. Monitor BG - likely elevated from pain. NGT currently in place. 10/25:  follow up. RD visited patient, he was ambulating in the hallway with PT encouraging patient to return to room, RD obtained standing weight as above. Wt down 4 lbs from admission, overall patient has lost >20 lbs from his reported UBW as above. Appetite is better and he is accepting ONS offered to help regain lost weight.  Last BM was 4 days ago - bowel regimen in place to relieve constipation. Pt is wanting to ambulate more to promote GI motility and get stronger. Last 3 Recorded Weights in this Encounter    10/20/22 0118 10/20/22 1307 10/25/22 1318   Weight: 79.4 kg (175 lb) 67.4 kg (148 lb 8 oz) 64.3 kg (141 lb 12.1 oz)       Meds:   Current Facility-Administered Medications   Medication Dose Route Frequency    docusate sodium (COLACE) capsule 100 mg  100 mg Oral BID    senna-docusate (PERICOLACE) 8.6-50 mg per tablet 1 Tablet  1 Tablet Oral DAILY    gabapentin (NEURONTIN) capsule 300 mg  300 mg Oral TID    enoxaparin (LOVENOX) injection 40 mg  40 mg SubCUTAneous Q24H    pantoprazole (PROTONIX) 40 mg in 0.9% sodium chloride 10 mL injection  40 mg IntraVENous Q12H     Nutrition Related Findings:      Wound Type: Surgical incision  Last Bowel Movement Date: 10/21/22  Abdominal Assessment: Semi-soft, Tender (midline incision)  Appetite: Good  Bowel Sounds: Hypoactive     Edema:No data recorded    Nutr.  Labs:  Lab Results   Component Value Date/Time    Creatinine 0.91 10/24/2022 04:01 AM    BUN 14 10/24/2022 04:01 AM    Sodium 142 10/24/2022 04:01 AM    Potassium 3.0 (L) 10/24/2022 04:01 AM    Chloride 109 (H) 10/24/2022 04:01 AM    CO2 27 10/24/2022 04:01 AM     Lab Results   Component Value Date/Time    Glucose 100 10/24/2022 04:01 AM     No results found for: HBA1C, ORM1ZHPO, FBB4HQMJ    Current Nutrition Intake & Therapies:  Average Meal Intake: 51-75%  Average Supplement Intake: %  ADULT DIET Regular; GI Agate (GERD/Peptic Ulcer)  ADULT ORAL NUTRITION SUPPLEMENT Lunch, Dinner, Breakfast; Standard High Calorie/High Protein    Documented Meal intake:  Patient Vitals for the past 168 hrs:   % Diet Eaten   10/25/22 0803 0%   10/24/22 1400 76 - 100%   10/24/22 0800 76 - 100%   10/23/22 1126 0%   10/21/22 1609 0%   10/21/22 1132 0%   10/21/22 0927 0%     Documentation of supplement intake:  Patient Vitals for the past 168 hrs: Supplement intake %   10/25/22 0803 0%   10/24/22 0800 0%   10/21/22 1609 0%   10/21/22 1132 0%   10/21/22 0927 0%       Anthropometric Measures:  Height: 6' (182.9 cm)  Ideal Body Weight (IBW): 178 lbs (81 kg)  Admission Body Weight: 145 lb  Current Body Wt:  64.3 kg (141 lb 12.1 oz), 79.6 % IBW. Standing scale (obtained by RD as of 10/25/22)  Current BMI (kg/m2): 19.2  Usual Body Weight: 74.8 kg (165 lb)  % Weight Change (Calculated): -14.1  Weight Adjustment: No adjustment                 BMI Category: Normal weight (BMI 18.5-24. 9)    Estimated Daily Nutrient Needs:  Energy Requirements Based On: Kcal/kg  Weight Used for Energy Requirements: Current  Energy (kcal/day): 1930 - 2250 kcal/d (30-35 kcal/d to promote wt regain)  Weight Used for Protein Requirements: Current  Protein (g/day): 84 g (1.3 g/kg)  Method Used for Fluid Requirements: 1 ml/kcal  Fluid (ml/day): 1930 - 2250 mL/d    Nutrition Diagnosis:   Inadequate protein-energy intake related to altered GI function as evidenced by NPO or clear liquid status due to medical condition, weight loss - resolved   Moderate malnutrition related to inadequate protein-energy intake PTA as evidenced by weight loss greater than or equal to 5% in 1 month, mild muscle loss, mild loss of subcutaneous fat.      Nutrition Interventions:   Food and/or Nutrient Delivery: Start oral nutrition supplement, Start oral diet  Nutrition Education/Counseling: No recommendations at this time  Coordination of Nutrition Care: Continue to monitor while inpatient, Interdisciplinary rounds  Plan of Care discussed with: patient    Goals:  Previous Goal Met: Progressing toward goal(s)  Goals: PO intake 75% or greater, by next RD assessment       Nutrition Monitoring and Evaluation:   Behavioral-Environmental Outcomes: None identified  Food/Nutrient Intake Outcomes: Food and nutrient intake, Supplement intake  Physical Signs/Symptoms Outcomes: Constipation, Weight    Discharge Planning: Continue oral nutrition supplement, Continue current diet    Samuel Rosado RD, MS  Contact: [phone]

## 2022-10-25 NOTE — PROGRESS NOTES
General Surgery    Patient is a 63year-old man s/p open Peter patch for perforated duodenal ulcer, POD#6. Patient states he is doing well. Yan solid food diet and eating all the food on the tray; denies nausea/vomiting/burping/belching/hiccupping/bloating. Passing more flatus/(-) BM. Ambulating without assistance. No family currently at bedside. No current facility-administered medications on file prior to encounter. Current Outpatient Medications on File Prior to Encounter   Medication Sig Dispense Refill    aspirin 81 mg chewable tablet Take 81 mg by mouth daily. gabapentin (NEURONTIN) 300 mg capsule Take 300 mg by mouth three (3) times daily. ibuprofen (Motrin IB) 200 mg tablet Take 200 mg by mouth every six (6) hours as needed for Pain. amLODIPine (NORVASC) 10 mg tablet Take 10 mg by mouth daily. diphenhydrAMINE (BENADRYL) 25 mg capsule Take 25 mg by mouth every six (6) hours as needed. aspirin delayed-release 325 mg tablet Take 325 mg by mouth every six (6) hours as needed for Pain. Patient Vitals for the past 24 hrs:   Temp Pulse Resp BP SpO2   10/25/22 1748 98.6 °F (37 °C) 87 16 136/84 95 %   10/25/22 1210 98.5 °F (36.9 °C) 91 16 (!) 154/76 94 %   10/25/22 1200 -- 86 -- (!) 145/94 --   10/25/22 1148 -- -- -- (!) 146/84 --   10/25/22 1145 -- 88 -- 133/88 --   10/25/22 0848 98.3 °F (36.8 °C) 87 16 138/87 94 %   10/25/22 0405 98.3 °F (36.8 °C) 81 16 138/77 92 %   10/25/22 0112 97.8 °F (36.6 °C) 77 16 (!) 143/76 92 %   10/24/22 2040 97.7 °F (36.5 °C) 75 16 129/88 94 %     Date 10/24/22 0700 - 10/25/22 0659 10/25/22 0700 - 10/26/22 0659   Shift 4633-2708 1122-9693 24 Hour Total 0700-1859 1900-0659 24 Hour Total   INTAKE   P.O. 880  880 600  600     P. O. 880  880 600  600   I. V.(mL/kg/hr) 0(0) 0(0) 0(0) 0  0     I.V.    0  0     Volume (fluconazole (DIFLUCAN) 400mg/200 mL IVPB (premix)) 0 0 0        Volume (piperacillin-tazobactam (ZOSYN) 3.375 g in 0.9% sodium chloride (MBP/ADV) 100 mL MBP) 0 0 0      Blood    0  0     Autotransfused    0  0   Other    0  0     Other    0  0   Shift Total(mL/kg) 880(13.1) 0(0) 880(13.1) 600(9.3)  600(9.3)   OUTPUT   Urine(mL/kg/hr) 200(0.2)  200(0.1)        Urine Voided 200  200        Urine Occurrence(s) 3 x 1 x 4 x      Emesis/NG output  0 0        Emesis  0 0        Emesis Occurrence(s)  0 x 0 x      Drains 110 75 185        Output (ml) (Bruce-Marrufo Drain 10/19/22 Left;Upper Abdomen) 110 75 185      Other  0 0        Other Output  0 0      Stool  0 0        Stool Occurrence(s) 0 x 0 x 0 x        Stool  0 0      Blood  0 0        Estimated Blood Loss  0 0        Quantitative Blood Loss  0 0        Blood  0 0      Shift Total(mL/kg) 310(4.6) 75(1.1) 385(5.7)       -75 495 600  600   Weight (kg) 67.4 67.4 67.4 64.3 64.3 64.3     Gen- no distress  Abd- soft; appropriately tender; decreased distension w/ min tympany; incision clean/dry/intact w/o erythema/drainage/ecchymosis; drain w/ serous output, removed and dressing placed      Recent Results (from the past 24 hour(s))   METABOLIC PANEL, BASIC    Collection Time: 10/25/22 12:54 PM   Result Value Ref Range    Sodium 142 136 - 145 mmol/L    Potassium 3.4 (L) 3.5 - 5.1 mmol/L    Chloride 108 97 - 108 mmol/L    CO2 29 21 - 32 mmol/L    Anion gap 5 5 - 15 mmol/L    Glucose 123 (H) 65 - 100 mg/dL    BUN 16 6 - 20 MG/DL    Creatinine 0.97 0.70 - 1.30 MG/DL    BUN/Creatinine ratio 16 12 - 20      eGFR >60 >60 ml/min/1.73m2    Calcium 8.8 8.5 - 10.1 MG/DL       A/P: Patient is a 63year-old man s/p open Peter patch for perforated duodenal ulcer, POD#6, improving.  -- Cont GI bland diet. -- Colace/senna. Awaiting return of bowel function. -- Pt for d/c home with home health once cleared by PT and bowel function has returned. Ephriam Fluke.  Alana Obando MD, FACS

## 2022-10-25 NOTE — PROGRESS NOTES
10/25/2022  4:34 PM  Update via chart review, pt is continuing to require medical management for s/p open Peter patch for perforated duodenal ulcer, POD #6. Transitions of Care Plan:  RUR 8 % Low risk of Readmission/Green  LOS 6 /days   Medical management continues  POD  #6 s/p open Berl Sear patch for perforated duodenal ulcer,   PT following recommending HH at [place] [address] for [facility] PT, CM met w/ pt, he is agreeable, FOC offered and letter signed for [facility], [address] [facility], [facility] and [address]   Pt is planning to DC to stay w/ father  [facility] [address] [facility] to home w/ family assistance and [facility]  Outpatient follow up surgery, PCP  Family will transport at DC   CM will continue to follow and assist w/ DC needs  Lennox Richardson, LUC

## 2022-10-26 VITALS
OXYGEN SATURATION: 94 % | TEMPERATURE: 97.6 F | WEIGHT: 141.76 LBS | DIASTOLIC BLOOD PRESSURE: 77 MMHG | SYSTOLIC BLOOD PRESSURE: 138 MMHG | BODY MASS INDEX: 19.2 KG/M2 | RESPIRATION RATE: 20 BRPM | HEIGHT: 72 IN | HEART RATE: 78 BPM

## 2022-10-26 PROCEDURE — 74011250636 HC RX REV CODE- 250/636: Performed by: SURGERY

## 2022-10-26 PROCEDURE — C9113 INJ PANTOPRAZOLE SODIUM, VIA: HCPCS | Performed by: SURGERY

## 2022-10-26 PROCEDURE — 74011250637 HC RX REV CODE- 250/637: Performed by: SURGERY

## 2022-10-26 PROCEDURE — 74011000250 HC RX REV CODE- 250: Performed by: SURGERY

## 2022-10-26 PROCEDURE — 74011250637 HC RX REV CODE- 250/637: Performed by: NURSE PRACTITIONER

## 2022-10-26 RX ORDER — OXYCODONE AND ACETAMINOPHEN 5; 325 MG/1; MG/1
1 TABLET ORAL
Qty: 12 TABLET | Refills: 0 | Status: SHIPPED | OUTPATIENT
Start: 2022-10-26 | End: 2022-10-29

## 2022-10-26 RX ORDER — PANTOPRAZOLE SODIUM 20 MG/1
40 TABLET, DELAYED RELEASE ORAL DAILY
Qty: 60 TABLET | Refills: 1 | Status: SHIPPED | OUTPATIENT
Start: 2022-10-26 | End: 2022-12-25

## 2022-10-26 RX ORDER — POLYETHYLENE GLYCOL 3350 17 G/17G
17 POWDER, FOR SOLUTION ORAL ONCE
Status: DISCONTINUED | OUTPATIENT
Start: 2022-10-26 | End: 2022-10-26 | Stop reason: HOSPADM

## 2022-10-26 RX ADMIN — GABAPENTIN 300 MG: 300 CAPSULE ORAL at 08:45

## 2022-10-26 RX ADMIN — DOCUSATE SODIUM 100 MG: 100 CAPSULE, LIQUID FILLED ORAL at 08:45

## 2022-10-26 RX ADMIN — SODIUM CHLORIDE 40 MG: 9 INJECTION INTRAMUSCULAR; INTRAVENOUS; SUBCUTANEOUS at 08:44

## 2022-10-26 RX ADMIN — POTASSIUM BICARBONATE 20 MEQ: 391 TABLET, EFFERVESCENT ORAL at 08:45

## 2022-10-26 RX ADMIN — ENOXAPARIN SODIUM 40 MG: 100 INJECTION SUBCUTANEOUS at 08:46

## 2022-10-26 RX ADMIN — SENNOSIDES AND DOCUSATE SODIUM 1 TABLET: 50; 8.6 TABLET ORAL at 08:45

## 2022-10-26 RX ADMIN — OXYCODONE AND ACETAMINOPHEN 2 TABLET: 5; 325 TABLET ORAL at 05:07

## 2022-10-26 NOTE — PROGRESS NOTES
Physician Progress Note      Selina Cooper  CSN #:                  [id number]  :                       1960  ADMIT DATE:       10/19/2022 3:12 PM  DISCH DATE:        10/26/2022 12:54 PM  RESPONDING  PROVIDER #:        Pasquale Donohue MD          QUERY TEXTBraydenyamileliot Hacking Afternoon    This patient admitted on 10/19/2022 for Perforated Pyloric Ulcer and s/p Exp. Lap and Ulcer repair    On 10/19 a CTA of the Chest noted \" Muscus plugging in bronchitis extending to the right lower lobe consistent with aspiration /Bronchitis. If possible, please document in progress notes and discharge summary if you are evaluating and/or treating any of the following: The medical record reflects the following:  Risk Factors: Post op exploratory Laparotomy for perforated pyloric ulcer  Clinical Indicators: 10/19---pt noted with increased resp. rate up to 30s, drop in o2 sats 92% on RA, CTA negative for PE + Right lower lobe with aspiration/bronchitis  Treatment:  o2 supplement, currently on 3 liters, IV Difulca on 10/19 and on 10/20 IV Zosyn q 8 hours added, CTA chest      Thank you  Salazar Braxton, CPHQ, CCDS, SMART  Options provided:  -- The patient is also being treated for Aspiration Pneumonia. -- The patient is also being treated for Aspiration Bronchitis. -- The CTA  on 10/19 noted is not clinically significant  -- Other - I will add my own diagnosis  -- Disagree - Not applicable / Not valid  -- Disagree - Clinically unable to determine / Unknown  -- Refer to Clinical Documentation Reviewer    PROVIDER RESPONSE TEXT:    Provider is clinically unable to determine a response to this query. Query created by: Marni Tamayo on 10/20/2022 10:08 AM      QUERY TEXT:    Good Afternoon    This patient admitted for perforated pyloric ulcer.     The patient noted 3/4 SIRS, and started on IV Zosyn and IV Diflucan \"for intra-abdominal infection\"      If possible, can you please further clarify the intraabdominal infection and please document in progress notes and discharge summary. The medical record reflects the following:  Risk Factors: Perforated pyloric ulcer, s/p Exp. Lap and ulcer repair. Clinical Indicators: WBC 18.2 HR 90-100s, resp up to 30 3/4 SIRS, Per operative report, A full suction device was used to aspirate thick, mucosal bilious drainage from the upper abdomen. At the pylorus there was an approx 8cm ulcer with bilious drainage. SKYE drain placed in the left upper quad and tucked under the liver near the pylorus  Treatment: Initially only on IV Diflucan, then on 10/20, pt started on IV Zosyn, CT abd. Repair of perforated pyloric ulcer    Thank you  LUC BullN,RN, CPHQ, CCDS, SMART  Options provided:  -- Intra-abdominal infection due to Peritonitis from perforated pyloric ulcer. -- Intra-abdominal infection is not due to peritonitis from perforated ulcer  -- Other - I will add my own diagnosis  -- Disagree - Not applicable / Not valid  -- Disagree - Clinically unable to determine / Unknown  -- Refer to Clinical Documentation Reviewer    PROVIDER RESPONSE TEXT:    Provider is clinically unable to determine a response to this query.     Query created by: Brynn Babb on 10/20/2022 10:34 AM      Electronically signed by:  Kandice Clarke MD 10/26/2022 7:49 PM

## 2022-10-26 NOTE — PROGRESS NOTES
General Surgery    Patient is a 63year-old man s/p open Peter patch for perforated pyloric ulcer, POD#7. Patient states he is doing well. Yan solid food diet and eating all the food on the tray; denies nausea/vomiting/burping/belching/hiccupping/bloating. Passing more flatus/(+) BM. Ambulating without assistance. No family currently at bedside. No current facility-administered medications on file prior to encounter. Current Outpatient Medications on File Prior to Encounter   Medication Sig Dispense Refill    aspirin 81 mg chewable tablet Take 81 mg by mouth daily. gabapentin (NEURONTIN) 300 mg capsule Take 300 mg by mouth three (3) times daily. ibuprofen (Motrin IB) 200 mg tablet Take 200 mg by mouth every six (6) hours as needed for Pain. amLODIPine (NORVASC) 10 mg tablet Take 10 mg by mouth daily. diphenhydrAMINE (BENADRYL) 25 mg capsule Take 25 mg by mouth every six (6) hours as needed. aspirin delayed-release 325 mg tablet Take 325 mg by mouth every six (6) hours as needed for Pain. Patient Vitals for the past 24 hrs:   Temp Pulse Resp BP SpO2   10/26/22 0740 97.6 °F (36.4 °C) 78 20 138/77 94 %   10/26/22 0400 98.5 °F (36.9 °C) 88 16 138/82 94 %   10/25/22 2357 97.7 °F (36.5 °C) 90 16 115/68 93 %   10/25/22 1955 98.2 °F (36.8 °C) 89 16 (!) 161/84 93 %   10/25/22 1748 98.6 °F (37 °C) 87 16 136/84 95 %   10/25/22 1210 98.5 °F (36.9 °C) 91 16 (!) 154/76 94 %   10/25/22 1200 -- 86 -- (!) 145/94 --   10/25/22 1148 -- -- -- (!) 146/84 --   10/25/22 1145 -- 88 -- 133/88 --     Date 10/25/22 0700 - 10/26/22 0659 10/26/22 0700 - 10/27/22 0659   Shift 0700-1859 1900-0659 24 Hour Total 0700-1859 1900-0659 24 Hour Total   INTAKE   P.O.         P. O.       I. V.(mL/kg/hr) 0(0)  0(0)        I.V. 0  0      Blood 0  0        Autotransfused 0  0      Other 0  0        Other 0  0      Shift Total(mL/kg) 600(9.3) 600(9.3) 1200(18.7)      OUTPUT Urine(mL/kg/hr)  0(0) 0(0)        Urine Voided  0 0        Urine Occurrence(s)  4 x 4 x      Stool           Stool Occurrence(s)  0 x 0 x      Shift Total(mL/kg)  0(0) 0(0)       627 6666      Weight (kg) 64.3 64.3 64.3 64.3 64.3 64.3     Gen- no distress  Abd- soft; appropriately tender; min distension w/ min tympany; incision clean/dry/intact w/o erythema/drainage/ecchymosis; drain site clean w/o drainage      Recent Results (from the past 24 hour(s))   METABOLIC PANEL, BASIC    Collection Time: 10/25/22 12:54 PM   Result Value Ref Range    Sodium 142 136 - 145 mmol/L    Potassium 3.4 (L) 3.5 - 5.1 mmol/L    Chloride 108 97 - 108 mmol/L    CO2 29 21 - 32 mmol/L    Anion gap 5 5 - 15 mmol/L    Glucose 123 (H) 65 - 100 mg/dL    BUN 16 6 - 20 MG/DL    Creatinine 0.97 0.70 - 1.30 MG/DL    BUN/Creatinine ratio 16 12 - 20      eGFR >60 >60 ml/min/1.73m2    Calcium 8.8 8.5 - 10.1 MG/DL       A/P: Patient is a 63year-old man s/p open Peter patch for perforated pyloric ulcer, POD#7, improving.  -- Cont GI bland diet. -- Cont PPI BID.  -- d/c to father's house with home health. -- F/U w/ Dr Katey Dennis in 2 weeks. Pino Cheng.  Sophie Prescott MD, FACS

## 2022-10-26 NOTE — PROGRESS NOTES
10/26/2022  12:58 PM  Pt medically stable for DC today. HH has been arranged w/ [facility] and AVS updated, [facility] agency has been notified of DC and AVS sent in [employer]. [facility] advised Geronimo date for 10/31, however if a spot opens up earlier they will see him sooner, pt understands. Order for DME/RW noted [address]  offered, verbal consent for referral to [facility], pt approved, AVS updated and RW delivered to bedside, completed tickets uploaded in [employer],  Family is bedside and will transport home. pt is ready for DC from CM standpoint  Care Management Interventions  PCP Verified by CM:  Yes  Transition of Care Consult (CM Consult): [address]: Yes  Support Systems: Parent(s) (roommates)  Confirm Follow Up Transport: Family  The Plan for Transition of Care is Related to the Following Treatment Goals : Home Health PT  The Patient and/or Patient Representative was Provided with a Choice of Provider and Agrees with the Discharge Plan?: Yes  Name of the Patient Representative Who was Provided with a Choice of Provider and Agrees with the Discharge Plan: Michelet Hdz ([facility])  [place] of Choice List was Provided with Basic Dialogue that Supports the Patient's Individualized Plan of Care/Goals, Treatment Preferences and Shares the Quality Data Associated with the Providers?: (S) Yes  Discharge Location  Patient Expects to be Discharged to[de-identified] Home with home health  LUC Ramírez

## 2022-10-26 NOTE — PROGRESS NOTES
Attempted to schedule follow up appointment with PCP  Guillermina Johnson MD phone just rings and rings no voice mail comes on to indicate if they are closed, at lunch etc. Checked google to verify if number is correct on the AVS wich it is accurate.

## 2022-10-26 NOTE — DISCHARGE SUMMARY
Discharge Summary    Patient: Catia Astroga               Sex: male          DOA: [unfilled]       YOB: 1960      Age:  63 y.o.        LOS:  LOS: 7 days                Admit Date: 10/19/2022    Discharge Date: 10/26/2022    Admission Diagnoses: Perforated bowel ([facility] [address] [K63.1]  Perforated ulcer ([facility] [address] [K27.5]    Discharge Diagnoses:    Problem List as of 10/26/2022 Never Reviewed            Codes Class Noted - Resolved    Perforated bowel ([facility] [address] ICD-10-CM: K63.1  ICD-9-CM: 569.83  10/19/2022 - Present        Perforated ulcer ([facility] [address] ICD-10-CM: K27.5  ICD-9-CM: 533.50  10/19/2022 - Present           Discharge Condition: Good    Hospital Course: Pt admitted on 10/19 with perforated pyloric ulcer. Pt taken emergently to OR for exploratory laparotomy with rosario patch repair of ulcer. Pt tolerated surgery well. Remained NPO until POD 3. Pt attempted liquid to assess for any leaks via SKYE drain. No leaks. Pt slowly had diet advanced. Pt progressed well. Antibx stopped after full 5 day course. Slow to have bowel function, but without ileus. Worked with PT aggresively over last few days. Had multiple steps at home and hx of spinal stenosis and unsteadiness. Pt cleared for home to father's house with only three steps and HH. Pt tolerating diet. Having Bms and flatus. No n/v, tolerating diet. Pt to follow up with Dr Lucas Martines in two weeks. Will be sent home on PPI and limited amount of pain medication. Significant Diagnostic Studies: see full electronic medical record    Discharge Medications:     Current Discharge Medication List        CONTINUE these medications which have NOT CHANGED    Details   aspirin 81 mg chewable tablet Take 81 mg by mouth daily. gabapentin (NEURONTIN) 300 mg capsule Take 300 mg by mouth three (3) times daily. ibuprofen (Motrin IB) 200 mg tablet Take 200 mg by mouth every six (6) hours as needed for Pain.       amLODIPine (NORVASC) 10 mg tablet Take 10 mg by mouth daily. diphenhydrAMINE (BENADRYL) 25 mg capsule Take 25 mg by mouth every six (6) hours as needed. aspirin delayed-release 325 mg tablet Take 325 mg by mouth every six (6) hours as needed for Pain. Activity: No lifting, Driving, or Strenuous exercise for 4 weeks    Diet: Regular Diet- low acid    Wound Care: Keep wound clean and dry    Follow-up: 2 weeks with Dr Yoly Castro.       Pt seen and discussed with Dr Melina Hutton, NP

## 2022-10-26 NOTE — DISCHARGE INSTRUCTIONS
Please schedule follow up with a gastrointestinal doctor as well to discuss long term treatment of gastric ulcers    Please take your pantroprazole (acid medicine) as prescribed    Avoid acidic foods and drinks

## (undated) DEVICE — ROCKER SWITCH PENCIL BLADE ELECTRODE, HOLSTER: Brand: EDGE

## (undated) DEVICE — SUT MCRYL 4-0 27IN PS2 UD --

## (undated) DEVICE — SUTURE NONABSORBABLE MONOFILAMENT 2-0 FS 18 IN ETHILON 664H

## (undated) DEVICE — SUTURE PDS II SZ 1 L96IN ABSRB VLT XLH L70MM 1/2 CIR Z881G

## (undated) DEVICE — AEGIS 1" DISK 4MM HOLE, PEEL OPEN: Brand: MEDLINE

## (undated) DEVICE — GLOVE SURG SZ 7 L12IN FNGR THK79MIL GRN LTX FREE

## (undated) DEVICE — SUTURE PDS II SZ 1 L96IN ABSRB VLT TP-1 L65MM 1/2 CIR Z880G

## (undated) DEVICE — DERMABOND SKIN ADH 0.7ML -- DERMABOND ADVANCED 12/BX

## (undated) DEVICE — CANISTER, RIGID, 3000CC: Brand: MEDLINE INDUSTRIES, INC.

## (undated) DEVICE — TOWEL SURG W17XL27IN STD BLU COT NONFENESTRATED PREWASHED

## (undated) DEVICE — GLOVE ORANGE PI 7   MSG9070

## (undated) DEVICE — SUTURE VCRL SZ 2-0 L36IN ABSRB UD L40MM CT 1/2 CIR J957H

## (undated) DEVICE — GLOVE ORANGE PI 7 1/2   MSG9075

## (undated) DEVICE — SUTURE PERMAHAND SZ 3-0 L18IN NONABSORBABLE BLK L26MM SH C013D

## (undated) DEVICE — SOLUTION IRRIG 1000ML 0.9% SOD CHL USP POUR PLAS BTL

## (undated) DEVICE — DRAPE FLD WRM W44XL66IN C6L FOR INTRATEMP SYS THERMABASIN

## (undated) DEVICE — TOTAL TRAY, 16FR 10ML SIL FOLEY, URN: Brand: MEDLINE

## (undated) DEVICE — LAPAROTOMY-SFMC: Brand: MEDLINE INDUSTRIES, INC.